# Patient Record
Sex: FEMALE | Race: WHITE | NOT HISPANIC OR LATINO | Employment: FULL TIME | URBAN - NONMETROPOLITAN AREA
[De-identification: names, ages, dates, MRNs, and addresses within clinical notes are randomized per-mention and may not be internally consistent; named-entity substitution may affect disease eponyms.]

---

## 2018-02-06 ENCOUNTER — HISTORICAL (OUTPATIENT)
Dept: ADMINISTRATIVE | Facility: HOSPITAL | Age: 43
End: 2018-02-06

## 2018-04-06 ENCOUNTER — HISTORICAL (OUTPATIENT)
Dept: ADMINISTRATIVE | Facility: HOSPITAL | Age: 43
End: 2018-04-06

## 2018-07-07 ENCOUNTER — HISTORICAL (OUTPATIENT)
Dept: ADMINISTRATIVE | Facility: HOSPITAL | Age: 43
End: 2018-07-07

## 2020-06-09 ENCOUNTER — HISTORICAL (OUTPATIENT)
Dept: ADMINISTRATIVE | Facility: HOSPITAL | Age: 45
End: 2020-06-09

## 2021-03-09 LAB
ALBUMIN SERPL-MCNC: 4.1 G/DL (ref 3.4–5)
ALBUMIN/GLOB SERPL: 1.6 {RATIO}
ALP SERPL-CCNC: 55 U/L (ref 50–144)
ALT SERPL-CCNC: 9 U/L (ref 1–45)
ANION GAP SERPL CALC-SCNC: 4 MMOL/L (ref 7–16)
AST SERPL-CCNC: 18 U/L (ref 14–36)
BASOPHILS # BLD AUTO: 0.03 X10(3)/MCL (ref 0.01–0.08)
BASOPHILS NFR BLD AUTO: 0.4 % (ref 0.1–1.2)
BILIRUB SERPL-MCNC: 0.59 MG/DL (ref 0.1–1)
BUN SERPL-MCNC: 20 MG/DL (ref 7–20)
CALCIUM SERPL-MCNC: 9.5 MG/DL (ref 8.4–10.2)
CHLORIDE SERPL-SCNC: 105 MMOL/L (ref 94–110)
CHOLEST SERPL-MCNC: 163 MG/DL (ref 0–200)
CO2 SERPL-SCNC: 28 MMOL/L (ref 21–32)
CREAT SERPL-MCNC: 0.7 MG/DL (ref 0.52–1.04)
CREAT/UREA NIT SERPL: 28.6 (ref 12–20)
EOSINOPHIL # BLD AUTO: 0.13 X10(3)/MCL (ref 0.04–0.36)
EOSINOPHIL NFR BLD AUTO: 1.7 % (ref 0.7–7)
ERYTHROCYTE [DISTWIDTH] IN BLOOD BY AUTOMATED COUNT: 12.4 % (ref 11–14.5)
GLOBULIN SER-MCNC: 2.5 G/DL (ref 2–3.9)
GLUCOSE SERPL-MCNC: 85 MGM./DL (ref 70–115)
HCT VFR BLD AUTO: 43 % (ref 36–48)
HDLC SERPL-MCNC: 60 MG/DL (ref 40–60)
HGB BLD-MCNC: 14.1 G/DL (ref 11.8–16)
IMM GRANULOCYTES # BLD AUTO: 0.01 X10E3/UL (ref 0–0.03)
IMM GRANULOCYTES NFR BLD AUTO: 0.1 % (ref 0–0.5)
LDLC SERPL CALC-MCNC: 92.6 MG/DL (ref 30–100)
LYMPHOCYTES # BLD AUTO: 2.32 X10(3)/MCL (ref 1.16–3.74)
LYMPHOCYTES NFR BLD AUTO: 31 % (ref 20–55)
MCH RBC QN AUTO: 31.3 PG (ref 27–34)
MCHC RBC AUTO-ENTMCNC: 32.8 G/DL (ref 31–37)
MCV RBC AUTO: 95.3 FL (ref 79–99)
MONOCYTES # BLD AUTO: 0.68 X10(3)/MCL (ref 0.24–0.36)
MONOCYTES NFR BLD AUTO: 9.1 % (ref 4.7–12.5)
NEUTROPHILS # BLD AUTO: 4.32 X10(3)/MCL (ref 1.56–6.13)
NEUTROPHILS NFR BLD AUTO: 57.7 % (ref 37–73)
PLATELET # BLD AUTO: 241 X10(3)/MCL (ref 140–371)
PMV BLD AUTO: 11.9 FL (ref 9.4–12.4)
POTASSIUM SERPL-SCNC: 4.7 MMOL/L (ref 3.5–5.1)
PROT SERPL-MCNC: 6.6 G/DL (ref 6.3–8.2)
RBC # BLD AUTO: 4.51 X10(6)/MCL (ref 4–5.1)
SODIUM SERPL-SCNC: 137 MMOL/L (ref 135–145)
TRIGL SERPL-MCNC: 56 MG/DL (ref 30–200)
TSH SERPL-ACNC: 1.1 UIU/ML (ref 0.36–3.74)
WBC # SPEC AUTO: 7.5 X10(3)/MCL (ref 4–11.5)

## 2021-10-13 LAB
HUMAN PAPILLOMAVIRUS (HPV): POSITIVE
PAP RECOMMENDATION EXT: ABNORMAL
PAP SMEAR: ABNORMAL

## 2022-03-21 ENCOUNTER — HISTORICAL (OUTPATIENT)
Dept: ADMINISTRATIVE | Facility: HOSPITAL | Age: 47
End: 2022-03-21

## 2022-04-11 ENCOUNTER — HISTORICAL (OUTPATIENT)
Dept: ADMINISTRATIVE | Facility: HOSPITAL | Age: 47
End: 2022-04-11

## 2022-04-29 VITALS
BODY MASS INDEX: 22.84 KG/M2 | HEIGHT: 62 IN | WEIGHT: 124.13 LBS | OXYGEN SATURATION: 98 % | SYSTOLIC BLOOD PRESSURE: 120 MMHG | DIASTOLIC BLOOD PRESSURE: 80 MMHG

## 2022-05-09 ENCOUNTER — HISTORICAL (OUTPATIENT)
Dept: ADMINISTRATIVE | Facility: HOSPITAL | Age: 47
End: 2022-05-09

## 2022-11-09 ENCOUNTER — HISTORICAL (OUTPATIENT)
Dept: ADMINISTRATIVE | Facility: HOSPITAL | Age: 47
End: 2022-11-09

## 2022-12-28 ENCOUNTER — HISTORICAL (OUTPATIENT)
Dept: ADMINISTRATIVE | Facility: HOSPITAL | Age: 47
End: 2022-12-28

## 2022-12-28 LAB — NONINV COLON CA DNA+OCC BLD SCRN STL QL: NEGATIVE

## 2023-01-19 ENCOUNTER — DOCUMENTATION ONLY (OUTPATIENT)
Dept: ADMINISTRATIVE | Facility: HOSPITAL | Age: 48
End: 2023-01-19
Payer: MEDICAID

## 2023-03-20 VITALS
SYSTOLIC BLOOD PRESSURE: 118 MMHG | HEART RATE: 73 BPM | HEIGHT: 62 IN | OXYGEN SATURATION: 97 % | DIASTOLIC BLOOD PRESSURE: 80 MMHG | WEIGHT: 128.31 LBS | TEMPERATURE: 99 F | BODY MASS INDEX: 23.61 KG/M2

## 2023-03-20 RX ORDER — FAMOTIDINE 20 MG/1
20 TABLET, FILM COATED ORAL 2 TIMES DAILY
COMMUNITY
Start: 2022-04-04 | End: 2023-04-28

## 2023-03-20 RX ORDER — NAPROXEN 500 MG/1
1 TABLET ORAL 2 TIMES DAILY
COMMUNITY
Start: 2022-10-21 | End: 2023-11-06

## 2023-03-20 RX ORDER — VILAZODONE HYDROCHLORIDE 40 MG/1
40 TABLET ORAL DAILY
COMMUNITY
Start: 2022-04-04 | End: 2023-04-28

## 2023-03-20 RX ORDER — ALPRAZOLAM 1 MG/1
1 TABLET ORAL DAILY PRN
COMMUNITY
Start: 2022-12-12 | End: 2023-06-13 | Stop reason: SDUPTHER

## 2023-04-19 VITALS
OXYGEN SATURATION: 97 % | BODY MASS INDEX: 23.61 KG/M2 | DIASTOLIC BLOOD PRESSURE: 80 MMHG | WEIGHT: 128.31 LBS | HEIGHT: 62 IN | TEMPERATURE: 99 F | SYSTOLIC BLOOD PRESSURE: 118 MMHG | HEART RATE: 73 BPM

## 2023-04-28 DIAGNOSIS — F33.9 MAJOR DEPRESSIVE DISORDER, RECURRENT, UNSPECIFIED: ICD-10-CM

## 2023-04-28 RX ORDER — VILAZODONE HYDROCHLORIDE 40 MG/1
TABLET ORAL
Qty: 30 TABLET | Refills: 0 | Status: SHIPPED | OUTPATIENT
Start: 2023-04-28 | End: 2023-06-06

## 2023-04-28 RX ORDER — FAMOTIDINE 20 MG/1
TABLET, FILM COATED ORAL
Qty: 60 TABLET | Refills: 0 | Status: SHIPPED | OUTPATIENT
Start: 2023-04-28 | End: 2023-06-13 | Stop reason: SDUPTHER

## 2023-05-24 VITALS
OXYGEN SATURATION: 97 % | HEIGHT: 62 IN | DIASTOLIC BLOOD PRESSURE: 80 MMHG | WEIGHT: 128.31 LBS | BODY MASS INDEX: 23.61 KG/M2 | TEMPERATURE: 99 F | SYSTOLIC BLOOD PRESSURE: 118 MMHG | HEART RATE: 73 BPM

## 2023-05-24 RX ORDER — VALACYCLOVIR HYDROCHLORIDE 500 MG/1
500 TABLET, FILM COATED ORAL NIGHTLY
COMMUNITY
Start: 2023-02-17 | End: 2023-09-19

## 2023-05-24 RX ORDER — MUPIROCIN 20 MG/G
1 OINTMENT TOPICAL 2 TIMES DAILY
COMMUNITY
Start: 2023-01-10 | End: 2023-11-06

## 2023-05-31 PROCEDURE — 80053 COMPREHEN METABOLIC PANEL: CPT | Performed by: NURSE PRACTITIONER

## 2023-05-31 PROCEDURE — 84443 ASSAY THYROID STIM HORMONE: CPT | Performed by: NURSE PRACTITIONER

## 2023-05-31 PROCEDURE — 83036 HEMOGLOBIN GLYCOSYLATED A1C: CPT | Performed by: NURSE PRACTITIONER

## 2023-05-31 PROCEDURE — 85025 COMPLETE CBC W/AUTO DIFF WBC: CPT | Performed by: NURSE PRACTITIONER

## 2023-05-31 PROCEDURE — 80061 LIPID PANEL: CPT | Performed by: NURSE PRACTITIONER

## 2023-06-06 DIAGNOSIS — F33.9 MAJOR DEPRESSIVE DISORDER, RECURRENT, UNSPECIFIED: ICD-10-CM

## 2023-06-06 RX ORDER — VILAZODONE HYDROCHLORIDE 40 MG/1
TABLET ORAL
Qty: 30 TABLET | Refills: 0 | Status: SHIPPED | OUTPATIENT
Start: 2023-06-06 | End: 2023-06-13 | Stop reason: SDUPTHER

## 2023-06-13 ENCOUNTER — OFFICE VISIT (OUTPATIENT)
Dept: FAMILY MEDICINE | Facility: CLINIC | Age: 48
End: 2023-06-13
Payer: MEDICAID

## 2023-06-13 ENCOUNTER — DOCUMENTATION ONLY (OUTPATIENT)
Dept: FAMILY MEDICINE | Facility: CLINIC | Age: 48
End: 2023-06-13

## 2023-06-13 VITALS
TEMPERATURE: 98 F | WEIGHT: 129.44 LBS | HEIGHT: 62 IN | SYSTOLIC BLOOD PRESSURE: 110 MMHG | OXYGEN SATURATION: 96 % | BODY MASS INDEX: 23.82 KG/M2 | DIASTOLIC BLOOD PRESSURE: 64 MMHG | HEART RATE: 70 BPM

## 2023-06-13 DIAGNOSIS — F33.9 EPISODE OF RECURRENT MAJOR DEPRESSIVE DISORDER, UNSPECIFIED DEPRESSION EPISODE SEVERITY: ICD-10-CM

## 2023-06-13 DIAGNOSIS — Z72.0 TOBACCO USER: ICD-10-CM

## 2023-06-13 DIAGNOSIS — F33.9 RECURRENT MAJOR DEPRESSIVE DISORDER, REMISSION STATUS UNSPECIFIED: ICD-10-CM

## 2023-06-13 DIAGNOSIS — R12 HEARTBURN: ICD-10-CM

## 2023-06-13 DIAGNOSIS — R10.13 EPIGASTRIC PAIN: ICD-10-CM

## 2023-06-13 DIAGNOSIS — F41.1 GENERALIZED ANXIETY DISORDER: ICD-10-CM

## 2023-06-13 DIAGNOSIS — Z00.01 ABNORMAL PHYSICAL EVALUATION: Primary | ICD-10-CM

## 2023-06-13 PROBLEM — G47.00 INSOMNIA: Status: ACTIVE | Noted: 2023-06-13

## 2023-06-13 PROCEDURE — 1159F PR MEDICATION LIST DOCUMENTED IN MEDICAL RECORD: ICD-10-PCS | Mod: CPTII,,, | Performed by: NURSE PRACTITIONER

## 2023-06-13 PROCEDURE — 1159F MED LIST DOCD IN RCRD: CPT | Mod: CPTII,,, | Performed by: NURSE PRACTITIONER

## 2023-06-13 PROCEDURE — 3078F DIAST BP <80 MM HG: CPT | Mod: CPTII,,, | Performed by: NURSE PRACTITIONER

## 2023-06-13 PROCEDURE — 1160F RVW MEDS BY RX/DR IN RCRD: CPT | Mod: CPTII,,, | Performed by: NURSE PRACTITIONER

## 2023-06-13 PROCEDURE — 1160F PR REVIEW ALL MEDS BY PRESCRIBER/CLIN PHARMACIST DOCUMENTED: ICD-10-PCS | Mod: CPTII,,, | Performed by: NURSE PRACTITIONER

## 2023-06-13 PROCEDURE — 99396 PR PREVENTIVE VISIT,EST,40-64: ICD-10-PCS | Mod: ,,, | Performed by: NURSE PRACTITIONER

## 2023-06-13 PROCEDURE — 3078F PR MOST RECENT DIASTOLIC BLOOD PRESSURE < 80 MM HG: ICD-10-PCS | Mod: CPTII,,, | Performed by: NURSE PRACTITIONER

## 2023-06-13 PROCEDURE — 3008F BODY MASS INDEX DOCD: CPT | Mod: CPTII,,, | Performed by: NURSE PRACTITIONER

## 2023-06-13 PROCEDURE — 3008F PR BODY MASS INDEX (BMI) DOCUMENTED: ICD-10-PCS | Mod: CPTII,,, | Performed by: NURSE PRACTITIONER

## 2023-06-13 PROCEDURE — 99396 PREV VISIT EST AGE 40-64: CPT | Mod: ,,, | Performed by: NURSE PRACTITIONER

## 2023-06-13 PROCEDURE — 3074F SYST BP LT 130 MM HG: CPT | Mod: CPTII,,, | Performed by: NURSE PRACTITIONER

## 2023-06-13 PROCEDURE — 3074F PR MOST RECENT SYSTOLIC BLOOD PRESSURE < 130 MM HG: ICD-10-PCS | Mod: CPTII,,, | Performed by: NURSE PRACTITIONER

## 2023-06-13 RX ORDER — VILAZODONE HYDROCHLORIDE 40 MG/1
40 TABLET ORAL DAILY
Qty: 30 TABLET | Refills: 11 | Status: SHIPPED | OUTPATIENT
Start: 2023-06-13 | End: 2024-03-18 | Stop reason: SDUPTHER

## 2023-06-13 RX ORDER — PANTOPRAZOLE SODIUM 40 MG/1
40 TABLET, DELAYED RELEASE ORAL DAILY
Qty: 30 TABLET | Refills: 11 | OUTPATIENT
Start: 2023-06-13 | End: 2023-08-17

## 2023-06-13 RX ORDER — ALPRAZOLAM 1 MG/1
1 TABLET ORAL DAILY PRN
Qty: 20 TABLET | Refills: 2 | Status: SHIPPED | OUTPATIENT
Start: 2023-06-13 | End: 2023-09-19 | Stop reason: SDUPTHER

## 2023-06-13 RX ORDER — SUCRALFATE 1 G/1
1 TABLET ORAL
Qty: 120 TABLET | Refills: 1 | Status: SHIPPED | OUTPATIENT
Start: 2023-06-13 | End: 2023-08-17

## 2023-06-13 RX ORDER — FAMOTIDINE 20 MG/1
20 TABLET, FILM COATED ORAL 2 TIMES DAILY
Qty: 60 TABLET | Refills: 11 | Status: SHIPPED | OUTPATIENT
Start: 2023-06-13

## 2023-06-13 NOTE — PROGRESS NOTES
Patient ID: Dawna Romero  : 1975    Chief Complaint: Annual Exam    Allergies: Patient has No Known Allergies.     History of Present Illness:  The patient is a 47 y.o. White female who presents to clinic for annual wellness visit.    Diet and nutrition:  Diet is high in salt, high in fat, low in fiber, high caloric intake, high carbohydrate meals, high calcium intake.    Fracture wrist: No history of fracture, no recent unexplained fracture.    Physical activity:  Does not exercise on a regular basis, good physical condition.    Depression risks:  + history of depression, never feel sad, empty, or tearful, no sleep disturbances, no agitation, no loss of energy, no feelings of worthlessness or guilt, no thoughts of suicide.    Orientation:  No disorientation to time, no disorientation to place.    Concentration and memory:  No decreased concentration ability, no memory lapses or loss, does not forget words.    Speech forward/motor difficulties: No speech difficulties, no difficulty expressing formulated concepts, no difficulty with fine manipulative tasks, no difficulty writing forward/copying, no slowed reaction time, does not knock things over when trying to pick them up.    Fall risk assessment:  No frequent falls while walking, no fall in the past year, no dizziness forward/vertigo, no fear of falling.  Hearing:  No loss of hearing, does not wear hearing aids.    Vision:  No vision problems, does wear glasses.    Activity of daily living: Able to bathe with limited or no assistance, able to control urination and bowels, able to dress with limited or no assistance, able to feed self with limited or no assistance, able to get out of chair or bed with limited or no assistance, able to [place] with limited or no assistance, able to toilet with limited are no assistance.    Activities of daily living:  Able to do housework with limited or no assistance, able to grocery shop with limited or no assistance,  able to manage medications with limited or no assistance, able to manage money with limited or no assistance, able to prepare meals with limited or no assistance, able to use the phone with limited or no assistance.    Screenings: not due for vaccinations, not due for breast cancer screening 12/28/22, not due for cervical cancer screening (apt with Dr. Jones July 2023), due for colorectal cancer screening.      Past Medical History:  has a past medical history of Anxiety, Generalized anxiety disorder, Heartburn, Insomnia, Numbness and tingling in right hand, Recurrent major depressive disorder, and Tobacco user.    Surgical History:  has a past surgical history that includes Cholecystectomy and Tubal ligation.    Family History: family history includes Diabetes type II in her mother.    Social History:  reports that she has been smoking cigarettes. She has a 10.00 pack-year smoking history. She has been exposed to tobacco smoke. She has never used smokeless tobacco.    Care Team: Patient Care Team:  JOSE Natarajan as PCP - General (Family Medicine)  Peterson Jones Jr., MD as Consulting Physician (Obstetrics and Gynecology)     Current Medications:  Current Outpatient Medications   Medication Instructions    ALPRAZolam (XANAX) 1 mg, Oral, Daily PRN    famotidine (PEPCID) 20 MG tablet Take 1 Tablet BY MOUTH TWO TIMES A DAY    mupirocin (BACTROBAN) 1 g, Topical (Top), 2 times daily    naproxen (NAPROSYN) 500 MG tablet 1 tablet, Oral, 2 times daily    valACYclovir (VALTREX) 500 mg, Oral, Nightly    vilazodone (VIIBRYD) 40 mg Tab tablet TAKE 1 TABLET BY MOUTH DAILY       Review of Systems   Constitutional:  Negative for appetite change, fatigue, fever and unexpected weight change.   HENT:  Negative for nasal congestion, ear pain, facial swelling, hearing loss, mouth sores, nosebleeds, sore throat and trouble swallowing.    Eyes:  Negative for pain, discharge, redness and visual disturbance.   Respiratory:  Negative for  "cough, chest tightness and shortness of breath.    Cardiovascular:  Negative for chest pain, palpitations and leg swelling.   Gastrointestinal:  Negative for abdominal pain, blood in stool, constipation, diarrhea and nausea.   Endocrine: Negative for cold intolerance, heat intolerance, polydipsia, polyphagia and polyuria.   Genitourinary:  Negative for difficulty urinating, dysuria, frequency and hematuria.   Musculoskeletal:  Negative for arthralgias, joint swelling and joint deformity.   Integumentary:  Negative for color change, rash and mole/lesion.   Neurological:  Negative for dizziness, weakness, headaches and memory loss.   Hematological:  Negative for adenopathy. Does not bruise/bleed easily.   Psychiatric/Behavioral:  Negative for confusion, sleep disturbance and suicidal ideas. The patient is nervous/anxious.       Visit Vitals  /64 (BP Location: Right arm, Patient Position: Sitting)   Pulse 70   Temp 98.1 °F (36.7 °C) (Temporal)   Ht 5' 1.81" (1.57 m)   Wt 58.7 kg (129 lb 6.6 oz)   LMP 06/08/2023 (Approximate)   SpO2 96%   BMI 23.82 kg/m²       Physical Exam  Vitals reviewed.   Constitutional:       General: She is not in acute distress.     Appearance: Normal appearance.   HENT:      Head: Normocephalic and atraumatic.      Right Ear: Tympanic membrane, ear canal and external ear normal.      Left Ear: Tympanic membrane, ear canal and external ear normal.      Nose: Nose normal. No congestion.      Mouth/Throat:      Mouth: Mucous membranes are moist.      Pharynx: Oropharynx is clear. No oropharyngeal exudate or posterior oropharyngeal erythema.   Eyes:      Extraocular Movements: Extraocular movements intact.      Conjunctiva/sclera: Conjunctivae normal.      Pupils: Pupils are equal, round, and reactive to light.   Cardiovascular:      Rate and Rhythm: Normal rate and regular rhythm.      Pulses: Normal pulses.      Heart sounds: No murmur heard.  Pulmonary:      Effort: Pulmonary effort is " normal.      Breath sounds: Normal breath sounds.   Abdominal:      General: Bowel sounds are normal.      Palpations: Abdomen is soft.      Tenderness: There is no abdominal tenderness.   Musculoskeletal:         General: No swelling, tenderness or deformity. Normal range of motion.      Cervical back: Normal range of motion and neck supple.   Lymphadenopathy:      Cervical: No cervical adenopathy.   Skin:     General: Skin is warm and dry.      Capillary Refill: Capillary refill takes less than 2 seconds.      Coloration: Skin is not jaundiced.      Findings: No rash.   Neurological:      Mental Status: She is alert and oriented to person, place, and time.      Cranial Nerves: No cranial nerve deficit.   Psychiatric:         Mood and Affect: Mood normal.         Behavior: Behavior normal.        Labs Reviewed:  Chemistry:  Lab Results   Component Value Date     05/31/2023    K 4.6 05/31/2023    CHLORIDE 107 05/31/2023    BUN 14.0 05/31/2023    CREATININE 0.61 (L) 05/31/2023    EGFRNORACEVR >90 05/31/2023    GLUCOSE 91 05/31/2023    CALCIUM 9.5 05/31/2023    ALKPHOS 60 05/31/2023    LABPROT 7.0 05/31/2023    ALBUMIN 4.7 05/31/2023    AST 24 05/31/2023    ALT 17 05/31/2023    TSH 0.995 05/31/2023        Lab Results   Component Value Date    HGBA1C 4.9 05/31/2023        Hematology:  Lab Results   Component Value Date    WBC 7.98 05/31/2023    RBC 4.21 05/31/2023    HGB 13.4 05/31/2023    HCT 38.7 05/31/2023    MCV 91.9 05/31/2023    MCH 31.8 05/31/2023    MCHC 34.6 05/31/2023    RDW 11.8 05/31/2023     05/31/2023    MPV 11.0 05/31/2023       Lipid Panel:  Lab Results   Component Value Date    CHOL 181 05/31/2023    HDL 68 (H) 05/31/2023    DLDL 91.2 05/31/2023    TRIG 58 05/31/2023        Assessment & Plan:  1. Abnormal physical evaluation  Overview:  Cervical Cancer Screening- established with Dr. Jones  Breast Cancer Screening- 12/28/22 mammogram, continue annual  Osteoporosis Screening- not due  Colon  [facility]   [facility]- [facility] with [facility]  Dental Exam- Re commend biannually.          2. Generalized anxiety disorder  Overview:  patient saw counselor and psychiatrist in the past. Uses xanax prn.     Assessment & Plan:  Has weaned down to 20 tabs/month    Orders:  -     ALPRAZolam (XANAX) 1 MG tablet; Take 1 tablet (1 mg total) by mouth daily as needed for Anxiety.  Dispense: 20 tablet; Refill: 2    3. Episode of recurrent major depressive disorder, unspecified depression episode severity  Overview:  Prozac and Effexor Prozac, Effexor, Vistaril ineffective.  Mood improved with Viibryd and p.r.n. Xanax      4. Tobacco user  Assessment & Plan:  Educated patient on need to identify triggers for cigarette smoking and to find an alternative to alleviate these triggers such as walking, eating unsalted sunflower seeds, eating carrots. Advised patient to develop a plan to quit smoking whether it is to decrease by a few cigarettes every 3-5 days or quit cold turkey. Advised patient to schedule a quit date. Spent 3 minutes discussing smoking cessation with patient. Patient states understanding. Patient declines medication.       5. Major depressive disorder, recurrent, unspecified  -     vilazodone (VIIBRYD) 40 mg Tab tablet; Take 1 tablet (40 mg total) by mouth once daily.  Dispense: 30 tablet; Refill: 11    6. Heartburn  -     famotidine (PEPCID) 20 MG tablet; Take 1 tablet (20 mg total) by mouth 2 (two) times daily.  Dispense: 60 tablet; Refill: 11  -     sucralfate (CARAFATE) 1 gram tablet; Take 1 tablet (1 g total) by mouth 4 (four) times daily before meals and nightly.  Dispense: 120 tablet; Refill: 1  -     pantoprazole (PROTONIX) 40 MG tablet; Take 1 tablet (40 mg total) by mouth once daily.  Dispense: 30 tablet; Refill: 11    7. Epigastric pain  -     sucralfate (CARAFATE) 1 gram tablet; Take 1 tablet (1 g total) by mouth 4 (four) times daily before meals  and nightly.  Dispense: 120 tablet; Refill: 1           Vaccinations:  Immunization History   Administered Date(s) Administered    DTaP 1975, 03/09/1978, 05/04/1978, 01/25/1979, 11/05/1981    IPV 1975, 03/09/1978, 05/04/1978, 01/25/1979, 11/05/1981    Influenza 02/10/2010    Influenza - Quadrivalent 10/30/2018, 10/07/2019, 03/16/2021    Influenza - Trivalent - PF (ADULT) 02/10/2010, 10/30/2018, 10/07/2019    MMR 01/25/1979    Td (ADULT) 06/15/1992, 06/05/2006    Tdap 11/08/2013, 05/12/2021       No future appointments.    Follow up for 1), 3 month f/u anxiety   2)1 year, Wellness, Fasting labs prior. Call sooner if needed.    JOSE BOLDEN

## 2023-06-13 NOTE — ASSESSMENT & PLAN NOTE
Educated patient on need to identify triggers for cigarette smoking and to find an alternative to alleviate these triggers such as walking, eating unsalted sunflower seeds, eating carrots. Advised patient to develop a plan to quit smoking whether it is to decrease by a few cigarettes every 3-5 days or quit cold turkey. Advised patient to schedule a quit date. Spent 3 minutes discussing smoking cessation with patient. Patient states understanding. Patient declines medication.

## 2023-08-17 ENCOUNTER — OFFICE VISIT (OUTPATIENT)
Dept: FAMILY MEDICINE | Facility: CLINIC | Age: 48
End: 2023-08-17
Payer: MEDICAID

## 2023-08-17 ENCOUNTER — HOSPITAL ENCOUNTER (EMERGENCY)
Facility: HOSPITAL | Age: 48
Discharge: HOME | End: 2023-08-17
Payer: MEDICAID

## 2023-08-17 VITALS
SYSTOLIC BLOOD PRESSURE: 122 MMHG | OXYGEN SATURATION: 98 % | WEIGHT: 136.13 LBS | HEART RATE: 71 BPM | DIASTOLIC BLOOD PRESSURE: 72 MMHG | WEIGHT: 133.81 LBS | BODY MASS INDEX: 25.05 KG/M2 | TEMPERATURE: 98 F | HEIGHT: 62 IN | SYSTOLIC BLOOD PRESSURE: 141 MMHG | DIASTOLIC BLOOD PRESSURE: 75 MMHG | HEIGHT: 62 IN | RESPIRATION RATE: 16 BRPM | HEART RATE: 80 BPM | TEMPERATURE: 98 F | BODY MASS INDEX: 24.63 KG/M2 | OXYGEN SATURATION: 98 %

## 2023-08-17 DIAGNOSIS — L02.224 BOIL, GROIN: ICD-10-CM

## 2023-08-17 DIAGNOSIS — R10.13 EPIGASTRIC PAIN: Primary | ICD-10-CM

## 2023-08-17 DIAGNOSIS — K29.00 ACUTE SUPERFICIAL GASTRITIS WITHOUT HEMORRHAGE: Primary | ICD-10-CM

## 2023-08-17 DIAGNOSIS — R10.13 EPIGASTRIC ABDOMINAL PAIN: ICD-10-CM

## 2023-08-17 LAB
ALBUMIN SERPL-MCNC: 4.1 G/DL (ref 3.4–5)
ALBUMIN/GLOB SERPL: 1.6 RATIO
ALP SERPL-CCNC: 54 UNIT/L (ref 50–144)
ALT SERPL-CCNC: 17 UNIT/L (ref 1–45)
AMPHET UR QL SCN: NEGATIVE
ANION GAP SERPL CALC-SCNC: 3 MEQ/L (ref 2–13)
APPEARANCE UR: CLEAR
AST SERPL-CCNC: 25 UNIT/L (ref 14–36)
B-HCG SERPL QL: NEGATIVE
BACTERIA #/AREA URNS AUTO: NORMAL /HPF
BARBITURATE SCN PRESENT UR: NEGATIVE
BASOPHILS # BLD AUTO: 0.04 X10(3)/MCL (ref 0.01–0.08)
BASOPHILS NFR BLD AUTO: 0.4 % (ref 0.1–1.2)
BENZODIAZ UR QL SCN: POSITIVE
BILIRUB SERPL-MCNC: 0.4 MG/DL (ref 0–1)
BILIRUB UR QL STRIP.AUTO: NEGATIVE
BUN SERPL-MCNC: 9 MG/DL (ref 7–20)
CALCIUM SERPL-MCNC: 9 MG/DL (ref 8.4–10.2)
CANNABINOIDS UR QL SCN: POSITIVE
CHLORIDE SERPL-SCNC: 107 MMOL/L (ref 98–110)
CO2 SERPL-SCNC: 29 MMOL/L (ref 21–32)
COCAINE UR QL SCN: NEGATIVE
COLOR UR: YELLOW
CREAT SERPL-MCNC: 0.64 MG/DL (ref 0.66–1.25)
CREAT/UREA NIT SERPL: 14 (ref 12–20)
EOSINOPHIL # BLD AUTO: 0.1 X10(3)/MCL (ref 0.04–0.36)
EOSINOPHIL NFR BLD AUTO: 0.9 % (ref 0.7–7)
ERYTHROCYTE [DISTWIDTH] IN BLOOD BY AUTOMATED COUNT: 11.7 % (ref 11–14.5)
GFR SERPLBLD CREATININE-BSD FMLA CKD-EPI: >90 MLS/MIN/1.73/M2
GLOBULIN SER-MCNC: 2.6 GM/DL (ref 2–3.9)
GLUCOSE SERPL-MCNC: 100 MG/DL (ref 70–115)
GLUCOSE UR QL STRIP.AUTO: NEGATIVE
HCT VFR BLD AUTO: 36.3 % (ref 36–48)
HGB BLD-MCNC: 12.6 G/DL (ref 11.8–16)
IMM GRANULOCYTES # BLD AUTO: 0.02 X10(3)/MCL (ref 0–0.03)
IMM GRANULOCYTES NFR BLD AUTO: 0.2 % (ref 0–0.5)
KETONES UR QL STRIP.AUTO: NEGATIVE
LEUKOCYTE ESTERASE UR QL STRIP.AUTO: NEGATIVE
LIPASE SERPL-CCNC: 38 U/L (ref 23–300)
LYMPHOCYTES # BLD AUTO: 3.4 X10(3)/MCL (ref 1.16–3.74)
LYMPHOCYTES NFR BLD AUTO: 32.2 % (ref 20–55)
MCH RBC QN AUTO: 31.9 PG (ref 27–34)
MCHC RBC AUTO-ENTMCNC: 34.7 G/DL (ref 31–37)
MCV RBC AUTO: 91.9 FL (ref 79–99)
METHADONE UR QL SCN: NEGATIVE
MONOCYTES # BLD AUTO: 0.89 X10(3)/MCL (ref 0.24–0.36)
MONOCYTES NFR BLD AUTO: 8.4 % (ref 4.7–12.5)
NEUTROPHILS # BLD AUTO: 6.1 X10(3)/MCL (ref 1.56–6.13)
NEUTROPHILS NFR BLD AUTO: 57.9 % (ref 37–73)
NITRITE UR QL STRIP.AUTO: NEGATIVE
NRBC BLD AUTO-RTO: 0 %
OPIATES UR QL SCN: NEGATIVE
PCP UR QL: NEGATIVE
PH UR STRIP.AUTO: 7.5 [PH]
PH UR: 7.5 [PH] (ref 3–11)
PLATELET # BLD AUTO: 247 X10(3)/MCL (ref 140–371)
PMV BLD AUTO: 10.4 FL (ref 9.4–12.4)
POTASSIUM SERPL-SCNC: 4 MMOL/L (ref 3.5–5.1)
PROT SERPL-MCNC: 6.7 GM/DL (ref 6.3–8.2)
PROT UR QL STRIP.AUTO: ABNORMAL
RBC # BLD AUTO: 3.95 X10(6)/MCL (ref 4–5.1)
RBC #/AREA URNS AUTO: NORMAL /HPF
RBC UR QL AUTO: ABNORMAL
SODIUM SERPL-SCNC: 139 MMOL/L (ref 135–145)
SP GR UR STRIP.AUTO: 1.01
SQUAMOUS #/AREA URNS AUTO: NORMAL /HPF
UROBILINOGEN UR STRIP-ACNC: 0.2
WBC # SPEC AUTO: 10.55 X10(3)/MCL (ref 4–11.5)
WBC #/AREA URNS AUTO: NORMAL /HPF

## 2023-08-17 PROCEDURE — 99214 OFFICE O/P EST MOD 30 MIN: CPT | Mod: ,,, | Performed by: NURSE PRACTITIONER

## 2023-08-17 PROCEDURE — 1159F MED LIST DOCD IN RCRD: CPT | Mod: CPTII,,, | Performed by: NURSE PRACTITIONER

## 2023-08-17 PROCEDURE — 3008F PR BODY MASS INDEX (BMI) DOCUMENTED: ICD-10-PCS | Mod: CPTII,,, | Performed by: NURSE PRACTITIONER

## 2023-08-17 PROCEDURE — 3008F BODY MASS INDEX DOCD: CPT | Mod: CPTII,,, | Performed by: NURSE PRACTITIONER

## 2023-08-17 PROCEDURE — 3044F HG A1C LEVEL LT 7.0%: CPT | Mod: CPTII,,, | Performed by: NURSE PRACTITIONER

## 2023-08-17 PROCEDURE — 3078F PR MOST RECENT DIASTOLIC BLOOD PRESSURE < 80 MM HG: ICD-10-PCS | Mod: CPTII,,, | Performed by: NURSE PRACTITIONER

## 2023-08-17 PROCEDURE — 81001 URINALYSIS AUTO W/SCOPE: CPT

## 2023-08-17 PROCEDURE — 85025 COMPLETE CBC W/AUTO DIFF WBC: CPT

## 2023-08-17 PROCEDURE — 96375 TX/PRO/DX INJ NEW DRUG ADDON: CPT

## 2023-08-17 PROCEDURE — 3078F DIAST BP <80 MM HG: CPT | Mod: CPTII,,, | Performed by: NURSE PRACTITIONER

## 2023-08-17 PROCEDURE — 1160F PR REVIEW ALL MEDS BY PRESCRIBER/CLIN PHARMACIST DOCUMENTED: ICD-10-PCS | Mod: CPTII,,, | Performed by: NURSE PRACTITIONER

## 2023-08-17 PROCEDURE — 36415 COLL VENOUS BLD VENIPUNCTURE: CPT

## 2023-08-17 PROCEDURE — 80307 DRUG TEST PRSMV CHEM ANLYZR: CPT

## 2023-08-17 PROCEDURE — 63600175 PHARM REV CODE 636 W HCPCS

## 2023-08-17 PROCEDURE — 1160F RVW MEDS BY RX/DR IN RCRD: CPT | Mod: CPTII,,, | Performed by: NURSE PRACTITIONER

## 2023-08-17 PROCEDURE — 81025 URINE PREGNANCY TEST: CPT

## 2023-08-17 PROCEDURE — 99214 PR OFFICE/OUTPT VISIT, EST, LEVL IV, 30-39 MIN: ICD-10-PCS | Mod: ,,, | Performed by: NURSE PRACTITIONER

## 2023-08-17 PROCEDURE — 83690 ASSAY OF LIPASE: CPT

## 2023-08-17 PROCEDURE — 96374 THER/PROPH/DIAG INJ IV PUSH: CPT

## 2023-08-17 PROCEDURE — 3074F PR MOST RECENT SYSTOLIC BLOOD PRESSURE < 130 MM HG: ICD-10-PCS | Mod: CPTII,,, | Performed by: NURSE PRACTITIONER

## 2023-08-17 PROCEDURE — 80053 COMPREHEN METABOLIC PANEL: CPT

## 2023-08-17 PROCEDURE — 1159F PR MEDICATION LIST DOCUMENTED IN MEDICAL RECORD: ICD-10-PCS | Mod: CPTII,,, | Performed by: NURSE PRACTITIONER

## 2023-08-17 PROCEDURE — 3074F SYST BP LT 130 MM HG: CPT | Mod: CPTII,,, | Performed by: NURSE PRACTITIONER

## 2023-08-17 PROCEDURE — 3044F PR MOST RECENT HEMOGLOBIN A1C LEVEL <7.0%: ICD-10-PCS | Mod: CPTII,,, | Performed by: NURSE PRACTITIONER

## 2023-08-17 PROCEDURE — 99284 EMERGENCY DEPT VISIT MOD MDM: CPT | Mod: 25

## 2023-08-17 PROCEDURE — 25000003 PHARM REV CODE 250

## 2023-08-17 RX ORDER — PANTOPRAZOLE SODIUM 40 MG/1
40 TABLET, DELAYED RELEASE ORAL DAILY
Qty: 30 TABLET | Refills: 0 | Status: SHIPPED | OUTPATIENT
Start: 2023-08-17 | End: 2023-09-19 | Stop reason: SDUPTHER

## 2023-08-17 RX ORDER — LIDOCAINE HYDROCHLORIDE 20 MG/ML
15 SOLUTION OROPHARYNGEAL ONCE
Status: DISCONTINUED | OUTPATIENT
Start: 2023-08-17 | End: 2023-08-18 | Stop reason: HOSPADM

## 2023-08-17 RX ORDER — ONDANSETRON 2 MG/ML
8 INJECTION INTRAMUSCULAR; INTRAVENOUS
Status: COMPLETED | OUTPATIENT
Start: 2023-08-17 | End: 2023-08-17

## 2023-08-17 RX ORDER — MAG HYDROX/ALUMINUM HYD/SIMETH 200-200-20
30 SUSPENSION, ORAL (FINAL DOSE FORM) ORAL ONCE
Status: COMPLETED | OUTPATIENT
Start: 2023-08-17 | End: 2023-08-17

## 2023-08-17 RX ORDER — HYDROMORPHONE HYDROCHLORIDE 1 MG/ML
0.5 INJECTION, SOLUTION INTRAMUSCULAR; INTRAVENOUS; SUBCUTANEOUS
Status: COMPLETED | OUTPATIENT
Start: 2023-08-17 | End: 2023-08-17

## 2023-08-17 RX ORDER — FAMOTIDINE 10 MG/ML
20 INJECTION INTRAVENOUS
Status: COMPLETED | OUTPATIENT
Start: 2023-08-17 | End: 2023-08-17

## 2023-08-17 RX ORDER — CLINDAMYCIN HYDROCHLORIDE 300 MG/1
300 CAPSULE ORAL EVERY 6 HOURS
Qty: 40 CAPSULE | Refills: 0 | Status: SHIPPED | OUTPATIENT
Start: 2023-08-17 | End: 2023-08-27

## 2023-08-17 RX ORDER — SUCRALFATE 1 G/1
1 TABLET ORAL 4 TIMES DAILY
Qty: 40 TABLET | Refills: 0 | Status: SHIPPED | OUTPATIENT
Start: 2023-08-17 | End: 2023-08-27

## 2023-08-17 RX ORDER — SUCRALFATE 1 G/10ML
1 SUSPENSION ORAL
Qty: 414 ML | Refills: 0 | OUTPATIENT
Start: 2023-08-17 | End: 2023-08-17

## 2023-08-17 RX ORDER — LORAZEPAM 2 MG/ML
1 INJECTION INTRAMUSCULAR
Status: COMPLETED | OUTPATIENT
Start: 2023-08-17 | End: 2023-08-17

## 2023-08-17 RX ORDER — ONDANSETRON 2 MG/ML
INJECTION INTRAMUSCULAR; INTRAVENOUS
Status: COMPLETED
Start: 2023-08-17 | End: 2023-08-17

## 2023-08-17 RX ADMIN — ONDANSETRON 8 MG: 2 INJECTION INTRAMUSCULAR; INTRAVENOUS at 10:08

## 2023-08-17 RX ADMIN — HYDROMORPHONE HYDROCHLORIDE 0.5 MG: 1 INJECTION, SOLUTION INTRAMUSCULAR; INTRAVENOUS; SUBCUTANEOUS at 10:08

## 2023-08-17 RX ADMIN — FAMOTIDINE 20 MG: 10 INJECTION, SOLUTION INTRAVENOUS at 10:08

## 2023-08-17 RX ADMIN — ALUMINUM HYDROXIDE, MAGNESIUM HYDROXIDE, AND SIMETHICONE 30 ML: 200; 200; 20 SUSPENSION ORAL at 10:08

## 2023-08-17 RX ADMIN — LORAZEPAM 1 MG: 2 INJECTION INTRAMUSCULAR; INTRAVENOUS at 10:08

## 2023-08-17 NOTE — PROGRESS NOTES
Patient ID: Dawna Romero  : 1975     Chief Complaint: Abdominal Pain    Allergies: Patient has No Known Allergies.     History of Present Illness:  The patient is a 47 y.o. White female who presents to clinic for evaluation and management with a chief complaint of Abdominal Pain   Patient requesting refill of carafate as this helped in the past when had similar abdominal pain.     Patient also reports a boil to right groin area. Denies any fever, drainage, n/v. Does admit to pain. Tried taking some left over keflex had at home but upset her stomach.     Abdominal Pain  This is a new problem. The current episode started in the past 7 days. The onset quality is sudden. The problem occurs constantly. The problem has been gradually improving. The pain is located in the epigastric region. The pain is moderate. The quality of the pain is burning and sharp. The abdominal pain does not radiate. Associated symptoms include anorexia. Pertinent negatives include no arthralgias, belching, constipation, diarrhea, dysuria, fever, flatus, frequency, headaches, hematochezia, hematuria, melena, myalgias, nausea, vomiting or weight loss. She has tried proton pump inhibitors and H2 blockers for the symptoms. The treatment provided mild relief.        Past Medical History:  has a past medical history of Anxiety, Generalized anxiety disorder, Heartburn, Insomnia, Numbness and tingling in right hand, Recurrent major depressive disorder, and Tobacco user.    Social History:  reports that she has been smoking cigarettes. She has a 10.0 pack-year smoking history. She has been exposed to tobacco smoke. She has never used smokeless tobacco.    Care Team: Patient Care Team:  Shaheen Wong APRN as PCP - General (Family Medicine)  Peterson Jones Jr., MD as Consulting Physician (Obstetrics and Gynecology)  Akira Chamberlain OD as Consulting Physician (Optometry)     Current Medications:  Current Outpatient Medications  "  Medication Instructions    ALPRAZolam (XANAX) 1 mg, Oral, Daily PRN    clindamycin (CLEOCIN) 300 mg, Oral, Every 6 hours    famotidine (PEPCID) 20 mg, Oral, 2 times daily    mupirocin (BACTROBAN) 1 g, Topical (Top), 2 times daily    naproxen (NAPROSYN) 500 MG tablet 1 tablet, Oral, 2 times daily    pantoprazole (PROTONIX) 40 mg, Oral, Daily    sucralfate (CARAFATE) 1 g, Oral, Before meals & nightly    valACYclovir (VALTREX) 500 mg, Oral, Nightly    vilazodone (VIIBRYD) 40 mg, Oral, Daily       Review of Systems   Constitutional:  Negative for fever and weight loss.   Gastrointestinal:  Positive for abdominal pain and anorexia. Negative for constipation, diarrhea, flatus, hematochezia, melena, nausea and vomiting.   Genitourinary:  Negative for dysuria, frequency and hematuria.   Musculoskeletal:  Negative for arthralgias and myalgias.   Neurological:  Negative for headaches.        Visit Vitals  /72 (BP Location: Right arm, Patient Position: Sitting, BP Method: Medium (Manual))   Pulse 80   Temp 98.2 °F (36.8 °C) (Temporal)   Ht 5' 1.81" (1.57 m)   Wt 60.7 kg (133 lb 13.1 oz)   LMP 08/06/2023 (Approximate)   SpO2 98%   BMI 24.63 kg/m²       Physical Exam  Vitals reviewed. Exam conducted with a chaperone present (YURY Hare).   Constitutional:       General: She is not in acute distress.     Appearance: Normal appearance.   HENT:      Head: Normocephalic and atraumatic.      Nose: Nose normal.      Mouth/Throat:      Mouth: Mucous membranes are moist.      Pharynx: Oropharynx is clear.   Eyes:      Conjunctiva/sclera: Conjunctivae normal.   Cardiovascular:      Rate and Rhythm: Normal rate and regular rhythm.      Pulses: Normal pulses.      Heart sounds: No murmur heard.  Pulmonary:      Effort: Pulmonary effort is normal.      Breath sounds: Normal breath sounds.   Abdominal:      General: Bowel sounds are normal.      Palpations: Abdomen is soft.      Tenderness: There is abdominal tenderness in the " epigastric area.   Musculoskeletal:         General: No swelling, tenderness or deformity. Normal range of motion.   Skin:     General: Skin is warm and dry.      Capillary Refill: Capillary refill takes less than 2 seconds.      Coloration: Skin is not jaundiced.      Findings: Lesion (boil to right labia majora, firm, tender, no drainage. approximately 1cm) present. No rash.   Neurological:      Mental Status: She is alert and oriented to person, place, and time.      Cranial Nerves: No cranial nerve deficit.   Psychiatric:         Mood and Affect: Mood normal.         Behavior: Behavior normal.            Labs Reviewed:  Chemistry:  Lab Results   Component Value Date     05/31/2023    K 4.6 05/31/2023    CHLORIDE 107 05/31/2023    BUN 14.0 05/31/2023    CREATININE 0.61 (L) 05/31/2023    EGFRNORACEVR >90 05/31/2023    GLUCOSE 91 05/31/2023    CALCIUM 9.5 05/31/2023    ALKPHOS 60 05/31/2023    LABPROT 7.0 05/31/2023    ALBUMIN 4.7 05/31/2023    AST 24 05/31/2023    ALT 17 05/31/2023    TSH 0.995 05/31/2023        Lab Results   Component Value Date    HGBA1C 4.9 05/31/2023        Hematology:  Lab Results   Component Value Date    WBC 7.98 05/31/2023    RBC 4.21 05/31/2023    HGB 13.4 05/31/2023    HCT 38.7 05/31/2023    MCV 91.9 05/31/2023    MCH 31.8 05/31/2023    MCHC 34.6 05/31/2023    RDW 11.8 05/31/2023     05/31/2023    MPV 11.0 05/31/2023       Lipid Panel:  Lab Results   Component Value Date    CHOL 181 05/31/2023    HDL 68 (H) 05/31/2023    DLDL 91.2 05/31/2023    TRIG 58 05/31/2023        Assessment & Plan:  1. Epigastric pain  -     sucralfate (CARAFATE) 100 mg/mL suspension; Take 10 mLs (1 g total) by mouth 4 (four) times daily before meals and nightly.  Dispense: 414 mL; Refill: 0    2. Boil, groin  Comments:  Warm moist compresses/warm soaks and tub.  Notify of any worsening.  Complete antibiotics as prescribed  Orders:  -     clindamycin (CLEOCIN) 300 MG capsule; Take 1 capsule (300 mg  total) by mouth every 6 (six) hours. for 10 days  Dispense: 40 capsule; Refill: 0         Future Appointments   Date Time Provider Department Center   9/14/2023 10:30 AM Shaheen Wong APRN [facility]   6/11/2024  8:00 AM LAB, [facility] LABORATORY DRAW [facility]   6/18/2024 10:00 [facility]       No follow-ups on file. Call sooner if needed.    JOSE BOLDEN

## 2023-08-18 ENCOUNTER — TELEPHONE (OUTPATIENT)
Dept: FAMILY MEDICINE | Facility: CLINIC | Age: 48
End: 2023-08-18
Payer: MEDICAID

## 2023-08-18 NOTE — ED PROVIDER NOTES
Encounter Date: 8/17/2023       History     Chief Complaint   Patient presents with    Abdominal Pain     Pt. Reports epigastric abd pain with diarrhea. Onset pass 2 day. Reports Hx of ulcers.reports not to be compliant with home meds. Denies vomiting, dysuria and fever.       47-year-old female presents complaining of epigastric pain for 2 days.  She has been nauseated, but has not been vomiting.  She has also experienced some diarrhea.  She has a history of cholecystectomy in the past.  Additionally, she has a history of peptic ulcer disease and gastritis.  There has been no fever or chills.    The history is provided by the patient.     Review of patient's allergies indicates:  No Known Allergies  Past Medical History:   Diagnosis Date    Anxiety     Generalized anxiety disorder     Heartburn     Insomnia     Numbness and tingling in right hand     Recurrent major depressive disorder     Tobacco user      Past Surgical History:   Procedure Laterality Date    CHOLECYSTECTOMY      TUBAL LIGATION       Family History   Problem Relation Age of Onset    Diabetes type II Mother      Social History     Tobacco Use    Smoking status: Every Day     Current packs/day: 1.00     Average packs/day: 1 pack/day for 10.0 years (10.0 ttl pk-yrs)     Types: Cigarettes     Passive exposure: Current    Smokeless tobacco: Never     Review of Systems   Constitutional:  Negative for fever.   HENT:  Negative for sore throat.    Respiratory:  Negative for shortness of breath.    Cardiovascular:  Negative for chest pain.   Gastrointestinal:  Positive for abdominal pain, diarrhea and nausea. Negative for vomiting.   Genitourinary:  Negative for dysuria.   Musculoskeletal:  Negative for back pain.   Skin:  Negative for rash.   Neurological:  Negative for weakness.   Hematological:  Does not bruise/bleed easily.   All other systems reviewed and are negative.      Physical Exam     Initial Vitals [08/17/23 2148]   BP Pulse Resp Temp SpO2    (!) 151/88 77 19 98.2 °F (36.8 °C) 99 %      MAP       --         Physical Exam    Nursing note reviewed.  Constitutional: Vital signs are normal. She appears well-developed and well-nourished. She is cooperative.   Patient appears rather uncomfortable   HENT:   Head: Normocephalic and atraumatic.   Mouth/Throat: Oropharynx is clear and moist.   Eyes: Conjunctivae, EOM and lids are normal. Pupils are equal, round, and reactive to light.   Neck: Trachea normal. Neck supple.   Normal range of motion.  Cardiovascular:  Normal rate, regular rhythm, normal heart sounds and intact distal pulses.           Pulmonary/Chest: Breath sounds normal.   Abdominal: Abdomen is soft. Bowel sounds are normal. She exhibits no distension. There is no abdominal tenderness. There is no rebound and no guarding.   Musculoskeletal:         General: Normal range of motion.      Cervical back: Normal, normal range of motion and neck supple.      Lumbar back: Normal.     Neurological: She is alert and oriented to person, place, and time. She has normal strength. Coordination normal. GCS score is 15. GCS eye subscore is 4. GCS verbal subscore is 5. GCS motor subscore is 6.   Skin: Skin is warm, dry and intact. Capillary refill takes less than 2 seconds.   Psychiatric: Her speech is normal and behavior is normal. Judgment and thought content normal. Cognition and memory are normal.   Patient appears a bit anxious         ED Course   Procedures  Labs Reviewed   COMPREHENSIVE METABOLIC PANEL - Abnormal; Notable for the following components:       Result Value    Creatinine 0.64 (*)     All other components within normal limits   CBC WITH DIFFERENTIAL - Abnormal; Notable for the following components:    RBC 3.95 (*)     Mono # 0.89 (*)     All other components within normal limits   URINALYSIS, REFLEX TO URINE CULTURE - Abnormal; Notable for the following components:    Protein, UA Trace (*)     Blood, UA Trace-Intact (*)     All other components  within normal limits    Narrative:      URINE STABILITY IS 2 HOURS AT ROOM TEMP OR    SIX HOURS REFRIGERATED. PERFORMING TESTING ON    SPECIMENS GREATER THAN THIS AGE MAY AFFECT THE    FOLLOWING TESTS:    PH          SPECIFIC GRAVITY           BLOOD    CLARITY     BILIRUBIN               UROBILINOGEN   DRUG SCREEN, URINE (BEAKER) - Abnormal; Notable for the following components:    Benzodiazepine, Urine Positive (*)     Cannabinoids, Urine Positive (*)     All other components within normal limits    Narrative:     Cut off concentrations:    Amphetamines - 1000 ng/ml  Barbiturates - 200 ng/ml  Benzodiazepine - 200 ng/ml  Cannabinoids (THC) - 50 ng/ml  Cocaine - 300 ng/ml  Fentanyl - 1.0 ng/ml  MDMA - 500 ng/ml  Opiates - 300 ng/ml   Phencyclidine (PCP) - 25 ng/ml  Methadone - 300 ng/ml      False negatives may result form substances such as bleach added to urine.  False positives may result for the presence of a substance with similar chemical structure to the drug or its metabolite.    This test provides only a PRELIMINARY analytical test result. A more specific alternate chemical method must be used in order to obtain a confirmed analytical result. Gas chromatography/mass spectrometry (GC/MS) is the preferred confirmatory method. Other chemical confirmation methods are available. Clinical consideration and professional judgement should be applied to any drug of abuse test result, particularly when preliminary positive results are used.    Positive results will be confirmed only at the physicians request. Unconfirmed screening results are to be used only for medical purposes (treatment).          LIPASE - Normal   HCG QUALITATIVE URINE - Normal   URINALYSIS, MICROSCOPIC - Normal   CBC W/ AUTO DIFFERENTIAL    Narrative:     The following orders were created for panel order CBC auto differential.  Procedure                               Abnormality         Status                     ---------                                -----------         ------                     CBC with Differential[[id number]]        Abnormal            Final result                 Please view results for these tests on the individual orders.          Imaging Results    None          Medications   aluminum-magnesium hydroxide-simethicone 200-200-20 mg/5 mL suspension 30 mL (30 mLs Oral Given 8/17/23 2249)     And   LIDOcaine HCl 2% oral solution 15 mL (15 mLs Oral Not Given 8/17/23 2300)   famotidine (PF) injection 20 mg (20 mg Intravenous Given 8/17/23 2248)   HYDROmorphone injection 0.5 mg (0.5 mg Intravenous Given 8/17/23 2249)   LORazepam injection 1 mg (1 mg Intravenous Given 8/17/23 2248)     Medical Decision Making  Epigastric abdominal pain, nausea and diarrhea x2 days.  History of cholecystectomy.  Nontender on exam.  Anxious.  GI cocktail, Pepcid, Dilaudid/Ativan  Abdominal labs.  No imaging is indicated at this time.    Amount and/or Complexity of Data Reviewed  Labs: ordered. Decision-making details documented in ED Course.    Risk  OTC drugs.  Prescription drug management.               ED Course as of 08/17/23 2254   Thu Aug 17, 2023   2205 CBC auto differential(!)  No anemia, no elevated white blood cell count [TM]   2220 Lipase  No pancreatitis here [TM]   2221 Comprehensive metabolic panel(!)  Normal CMP [TM]      ED Course User Index  [TM] Pratik Islas MD                    Clinical Impression:   Final diagnoses:  [R10.13] Epigastric abdominal pain  [K29.00] Acute superficial gastritis without hemorrhage (Primary)        ED Disposition Condition    Discharge Good          ED Prescriptions       Medication Sig Dispense Start Date End Date Auth. Provider    pantoprazole (PROTONIX) 40 MG tablet Take 1 tablet (40 mg total) by mouth once daily. 30 tablet 8/17/2023 9/16/2023 Pratik Islas MD    sucralfate (CARAFATE) 1 gram tablet Take 1 tablet (1 g total) by mouth 4 (four) times daily. for 10 days 40 tablet 8/17/2023 8/27/2023  Pratik Islas MD          Follow-up Information       Follow up With Specialties Details Why Contact Info    Shaheen Wong APRN Family Medicine Call in 1 day  [address] [facility]  [address]  [facility] [place]  [place] [address]  [phone]               Pratik Islas MD  08/17/23 5173

## 2023-09-08 ENCOUNTER — HOSPITAL ENCOUNTER (EMERGENCY)
Facility: HOSPITAL | Age: 48
Discharge: HOME | End: 2023-09-08
Payer: MEDICAID

## 2023-09-08 VITALS
SYSTOLIC BLOOD PRESSURE: 143 MMHG | HEART RATE: 87 BPM | OXYGEN SATURATION: 98 % | DIASTOLIC BLOOD PRESSURE: 76 MMHG | WEIGHT: 130 LBS | BODY MASS INDEX: 23.92 KG/M2 | HEIGHT: 62 IN | RESPIRATION RATE: 18 BRPM | TEMPERATURE: 98 F

## 2023-09-08 DIAGNOSIS — R10.13 EPIGASTRIC ABDOMINAL PAIN: Primary | ICD-10-CM

## 2023-09-08 LAB
ALBUMIN SERPL-MCNC: 4.3 G/DL (ref 3.4–5)
ALBUMIN/GLOB SERPL: 1.6 RATIO
ALP SERPL-CCNC: 65 UNIT/L (ref 50–144)
ALT SERPL-CCNC: 27 UNIT/L (ref 1–45)
ANION GAP SERPL CALC-SCNC: 6 MEQ/L (ref 2–13)
AST SERPL-CCNC: 30 UNIT/L (ref 14–36)
BASOPHILS # BLD AUTO: 0.02 X10(3)/MCL (ref 0.01–0.08)
BASOPHILS NFR BLD AUTO: 0.3 % (ref 0.1–1.2)
BILIRUB SERPL-MCNC: 0.5 MG/DL (ref 0–1)
BUN SERPL-MCNC: 11 MG/DL (ref 7–20)
CALCIUM SERPL-MCNC: 9.4 MG/DL (ref 8.4–10.2)
CHLORIDE SERPL-SCNC: 103 MMOL/L (ref 98–110)
CO2 SERPL-SCNC: 28 MMOL/L (ref 21–32)
CREAT SERPL-MCNC: 0.55 MG/DL (ref 0.66–1.25)
CREAT/UREA NIT SERPL: 20 (ref 12–20)
EOSINOPHIL # BLD AUTO: 0.05 X10(3)/MCL (ref 0.04–0.36)
EOSINOPHIL NFR BLD AUTO: 0.7 % (ref 0.7–7)
ERYTHROCYTE [DISTWIDTH] IN BLOOD BY AUTOMATED COUNT: 11.6 % (ref 11–14.5)
GFR SERPLBLD CREATININE-BSD FMLA CKD-EPI: >90 MLS/MIN/1.73/M2
GLOBULIN SER-MCNC: 2.7 GM/DL (ref 2–3.9)
GLUCOSE SERPL-MCNC: 113 MG/DL (ref 70–115)
HCT VFR BLD AUTO: 41.5 % (ref 36–48)
HGB BLD-MCNC: 14.2 G/DL (ref 11.8–16)
IMM GRANULOCYTES # BLD AUTO: 0.03 X10(3)/MCL (ref 0–0.03)
IMM GRANULOCYTES NFR BLD AUTO: 0.4 % (ref 0–0.5)
LIPASE SERPL-CCNC: 28 U/L (ref 23–300)
LYMPHOCYTES # BLD AUTO: 2.46 X10(3)/MCL (ref 1.16–3.74)
LYMPHOCYTES NFR BLD AUTO: 34.6 % (ref 20–55)
MCH RBC QN AUTO: 31.1 PG (ref 27–34)
MCHC RBC AUTO-ENTMCNC: 34.2 G/DL (ref 31–37)
MCV RBC AUTO: 90.8 FL (ref 79–99)
MONOCYTES # BLD AUTO: 0.4 X10(3)/MCL (ref 0.24–0.36)
MONOCYTES NFR BLD AUTO: 5.6 % (ref 4.7–12.5)
NEUTROPHILS # BLD AUTO: 4.16 X10(3)/MCL (ref 1.56–6.13)
NEUTROPHILS NFR BLD AUTO: 58.4 % (ref 37–73)
PLATELET # BLD AUTO: 197 X10(3)/MCL (ref 140–371)
PLATELET # BLD EST: NORMAL 10*3/UL
PMV BLD AUTO: 10.4 FL (ref 9.4–12.4)
POTASSIUM SERPL-SCNC: 4.1 MMOL/L (ref 3.5–5.1)
PROT SERPL-MCNC: 7 GM/DL (ref 6.3–8.2)
RBC # BLD AUTO: 4.57 X10(6)/MCL (ref 4–5.1)
RBC MORPH BLD: NORMAL
SODIUM SERPL-SCNC: 137 MMOL/L (ref 135–145)
WBC # SPEC AUTO: 7.12 X10(3)/MCL (ref 4–11.5)

## 2023-09-08 PROCEDURE — 80053 COMPREHEN METABOLIC PANEL: CPT | Performed by: EMERGENCY MEDICINE

## 2023-09-08 PROCEDURE — 83690 ASSAY OF LIPASE: CPT | Performed by: EMERGENCY MEDICINE

## 2023-09-08 PROCEDURE — 36415 COLL VENOUS BLD VENIPUNCTURE: CPT | Performed by: EMERGENCY MEDICINE

## 2023-09-08 PROCEDURE — 85025 COMPLETE CBC W/AUTO DIFF WBC: CPT | Performed by: EMERGENCY MEDICINE

## 2023-09-08 PROCEDURE — 99284 EMERGENCY DEPT VISIT MOD MDM: CPT | Mod: 25

## 2023-09-08 PROCEDURE — 63600175 PHARM REV CODE 636 W HCPCS: Performed by: EMERGENCY MEDICINE

## 2023-09-08 PROCEDURE — 25000003 PHARM REV CODE 250: Performed by: EMERGENCY MEDICINE

## 2023-09-08 PROCEDURE — 96375 TX/PRO/DX INJ NEW DRUG ADDON: CPT

## 2023-09-08 PROCEDURE — 96374 THER/PROPH/DIAG INJ IV PUSH: CPT

## 2023-09-08 RX ORDER — LIDOCAINE HYDROCHLORIDE 20 MG/ML
15 SOLUTION OROPHARYNGEAL ONCE
Status: DISCONTINUED | OUTPATIENT
Start: 2023-09-08 | End: 2023-09-08 | Stop reason: HOSPADM

## 2023-09-08 RX ORDER — ONDANSETRON 2 MG/ML
4 INJECTION INTRAMUSCULAR; INTRAVENOUS
Status: COMPLETED | OUTPATIENT
Start: 2023-09-08 | End: 2023-09-08

## 2023-09-08 RX ORDER — MAG HYDROX/ALUMINUM HYD/SIMETH 200-200-20
30 SUSPENSION, ORAL (FINAL DOSE FORM) ORAL ONCE
Status: COMPLETED | OUTPATIENT
Start: 2023-09-08 | End: 2023-09-08

## 2023-09-08 RX ORDER — FAMOTIDINE 10 MG/ML
20 INJECTION INTRAVENOUS
Status: COMPLETED | OUTPATIENT
Start: 2023-09-08 | End: 2023-09-08

## 2023-09-08 RX ADMIN — ONDANSETRON 4 MG: 2 INJECTION INTRAMUSCULAR; INTRAVENOUS at 04:09

## 2023-09-08 RX ADMIN — ALUMINUM HYDROXIDE, MAGNESIUM HYDROXIDE, AND SIMETHICONE 30 ML: 200; 200; 20 SUSPENSION ORAL at 03:09

## 2023-09-08 RX ADMIN — FAMOTIDINE 20 MG: 10 INJECTION, SOLUTION INTRAVENOUS at 04:09

## 2023-09-08 NOTE — ED PROVIDER NOTES
"Encounter Date: 9/8/2023       History     Chief Complaint   Patient presents with    Abdominal Pain     Patient complaining of epigastric that started 9/7/2023 at 19:00 after eating fried shrimp.      Patient is a 47-year-old female who presents today with complaints of epigastric abdominal pain.  She states "I have these episodes kind of often." She says she takes multiple stomach medications.  She is unsure of the name of them, but states she is prescribed one med 4 times a day " to coat my stomach." She says that she was seen here just a couple weeks ago.  She says she was given a medication at 1st that helped, but then she was given IV pain medication, and she did not like the way the pain medication made her feel.  She states that she got dizzy after the pain medication.  She denies any blood in her stool.  She reports 1 episode vomiting upon arrival to the emergency department.  She says that she already feels a little better after vomiting.  Denies fever, chest pain, dyspnea, flank pain, dysuria, hematuria, and all other symptoms.      Review of patient's allergies indicates:  No Known Allergies  Past Medical History:   Diagnosis Date    Anxiety     Generalized anxiety disorder     Heartburn     Insomnia     Numbness and tingling in right hand     Recurrent major depressive disorder     Tobacco user      Past Surgical History:   Procedure Laterality Date    CHOLECYSTECTOMY      TUBAL LIGATION       Family History   Problem Relation Age of Onset    Diabetes type II Mother      Social History     Tobacco Use    Smoking status: Every Day     Current packs/day: 1.00     Average packs/day: 1 pack/day for 10.0 years (10.0 ttl pk-yrs)     Types: Cigarettes     Passive exposure: Current    Smokeless tobacco: Never     Review of Systems   Gastrointestinal:  Positive for abdominal pain, nausea and vomiting.       Physical Exam     Initial Vitals [09/08/23 0305]   BP Pulse Resp Temp SpO2   (!) 169/98 60 18 97.7 °F " (36.5 °C) 98 %      MAP       --         Physical Exam    ED Course   Procedures  Labs Reviewed   COMPREHENSIVE METABOLIC PANEL - Abnormal; Notable for the following components:       Result Value    Creatinine 0.55 (*)     All other components within normal limits   CBC WITH DIFFERENTIAL - Abnormal; Notable for the following components:    Mono # 0.40 (*)     All other components within normal limits   LIPASE - Normal   BLOOD SMEAR MICROSCOPIC EXAM ([facility]) - Normal   CBC W/ AUTO DIFFERENTIAL    Narrative:     The following orders were created for panel order CBC auto differential.  Procedure                               Abnormality         Status                     ---------                               -----------         ------                     CBC with Differential[[id number]]        Abnormal            Final result                 Please view results for these tests on the individual orders.          Imaging Results    None          Medications   aluminum-magnesium hydroxide-simethicone 200-200-20 mg/5 mL suspension 30 mL (30 mLs Oral Given 9/8/23 0350)     And   LIDOcaine HCl 2% oral solution 15 mL (15 mLs Oral Not Given 9/8/23 0445)   ondansetron injection 4 mg (4 mg Intravenous Given 9/8/23 0401)   famotidine (PF) injection 20 mg (20 mg Intravenous Given 9/8/23 0403)     Medical Decision Making  47-year-old female with epigastric pain and known gastritis GERD.  History of cholecystectomy.  She was treated with Zofran, to acid, and famotidine with complete resolution of her symptoms.  Patient states that she is supposed to be getting an EGD soon.  Advised outpatient follow-up.  ER return precautions provided.  Patient to continue her home regimen of multiple medications for gastritis    Amount and/or Complexity of Data Reviewed  External Data Reviewed: notes.     Details: Patient has a history of cholecystectomy.  Last ED visit, patient was given a GI cocktail and said  Labs: ordered. Decision-making  details documented in ED Course.    Risk  OTC drugs.  Prescription drug management.                               Clinical Impression:   Final diagnoses:  [R10.13] Epigastric abdominal pain (Primary)        ED Disposition Condition    Discharge Stable          ED Prescriptions    None       Follow-up Information       Follow up With Specialties Details Why Contact Info    Shaheen Wong APRN Family Medicine Call   [address]  [address]  [facility] [id number]  [phone]      [facility] Emergency Medicine  As needed, If symptoms worsen [address]  [facility] [id number] [phone]             Bhavesh Sharp MD  09/08/23 9975

## 2023-09-18 PROBLEM — Z00.01 ABNORMAL PHYSICAL EVALUATION: Status: RESOLVED | Noted: 2023-06-13 | Resolved: 2023-09-18

## 2023-09-19 ENCOUNTER — OFFICE VISIT (OUTPATIENT)
Dept: FAMILY MEDICINE | Facility: CLINIC | Age: 48
End: 2023-09-19
Payer: MEDICAID

## 2023-09-19 VITALS
HEART RATE: 94 BPM | WEIGHT: 131.19 LBS | DIASTOLIC BLOOD PRESSURE: 60 MMHG | OXYGEN SATURATION: 97 % | HEIGHT: 62 IN | SYSTOLIC BLOOD PRESSURE: 118 MMHG | BODY MASS INDEX: 24.14 KG/M2 | TEMPERATURE: 98 F

## 2023-09-19 DIAGNOSIS — R10.13 EPIGASTRIC PAIN: ICD-10-CM

## 2023-09-19 DIAGNOSIS — F41.1 GENERALIZED ANXIETY DISORDER: Primary | ICD-10-CM

## 2023-09-19 DIAGNOSIS — R12 HEARTBURN: ICD-10-CM

## 2023-09-19 PROCEDURE — 99214 PR OFFICE/OUTPT VISIT, EST, LEVL IV, 30-39 MIN: ICD-10-PCS | Mod: ,,, | Performed by: NURSE PRACTITIONER

## 2023-09-19 PROCEDURE — 3078F DIAST BP <80 MM HG: CPT | Mod: CPTII,,, | Performed by: NURSE PRACTITIONER

## 2023-09-19 PROCEDURE — 1159F PR MEDICATION LIST DOCUMENTED IN MEDICAL RECORD: ICD-10-PCS | Mod: CPTII,,, | Performed by: NURSE PRACTITIONER

## 2023-09-19 PROCEDURE — 3008F PR BODY MASS INDEX (BMI) DOCUMENTED: ICD-10-PCS | Mod: CPTII,,, | Performed by: NURSE PRACTITIONER

## 2023-09-19 PROCEDURE — 3074F PR MOST RECENT SYSTOLIC BLOOD PRESSURE < 130 MM HG: ICD-10-PCS | Mod: CPTII,,, | Performed by: NURSE PRACTITIONER

## 2023-09-19 PROCEDURE — 3044F HG A1C LEVEL LT 7.0%: CPT | Mod: CPTII,,, | Performed by: NURSE PRACTITIONER

## 2023-09-19 PROCEDURE — 99214 OFFICE O/P EST MOD 30 MIN: CPT | Mod: ,,, | Performed by: NURSE PRACTITIONER

## 2023-09-19 PROCEDURE — 1159F MED LIST DOCD IN RCRD: CPT | Mod: CPTII,,, | Performed by: NURSE PRACTITIONER

## 2023-09-19 PROCEDURE — 3008F BODY MASS INDEX DOCD: CPT | Mod: CPTII,,, | Performed by: NURSE PRACTITIONER

## 2023-09-19 PROCEDURE — 3078F PR MOST RECENT DIASTOLIC BLOOD PRESSURE < 80 MM HG: ICD-10-PCS | Mod: CPTII,,, | Performed by: NURSE PRACTITIONER

## 2023-09-19 PROCEDURE — 3074F SYST BP LT 130 MM HG: CPT | Mod: CPTII,,, | Performed by: NURSE PRACTITIONER

## 2023-09-19 PROCEDURE — 3044F PR MOST RECENT HEMOGLOBIN A1C LEVEL <7.0%: ICD-10-PCS | Mod: CPTII,,, | Performed by: NURSE PRACTITIONER

## 2023-09-19 RX ORDER — ALPRAZOLAM 1 MG/1
1 TABLET ORAL DAILY PRN
Qty: 20 TABLET | Refills: 2 | Status: SHIPPED | OUTPATIENT
Start: 2023-09-19 | End: 2023-12-18 | Stop reason: SDUPTHER

## 2023-09-19 RX ORDER — PANTOPRAZOLE SODIUM 40 MG/1
40 TABLET, DELAYED RELEASE ORAL DAILY
Qty: 30 TABLET | Refills: 0 | Status: SHIPPED | OUTPATIENT
Start: 2023-09-19 | End: 2023-11-06

## 2023-09-19 RX ORDER — SUCRALFATE 1 G/1
1 TABLET ORAL 4 TIMES DAILY
COMMUNITY

## 2023-09-19 NOTE — PROGRESS NOTES
Patient ID: Dawna Romero  : 1975     Chief Complaint: Anxiety (3mth f/u anxiety)    Allergies: Patient has No Known Allergies.     History of Present Illness:  The patient is a 47 y.o. White female who presents to clinic for evaluation and management with a chief complaint of Anxiety (3mth f/u anxiety)   Anxiety  Presents for follow-up visit. Symptoms include excessive worry, muscle tension, nausea, nervous/anxious behavior, panic and restlessness. Patient reports no chest pain, compulsions, confusion, decreased concentration, depressed mood, dizziness, dry mouth, feeling of choking, hyperventilation, impotence, insomnia, irritability, malaise, obsessions, shortness of breath or suicidal ideas. The severity of symptoms is causing significant distress and interfering with daily activities. The quality of sleep is fair.       Abdominal Pain  This is a recurrent problem. The current episode started more than 1 month ago. The onset quality is gradual. The problem occurs intermittently. The problem has been waxing and waning. The pain is located in the epigastric region. The pain is at a severity of 10/10. The quality of the pain is aching and burning. The abdominal pain does not radiate. Associated symptoms include anorexia, belching, nausea and vomiting. Pertinent negatives include no arthralgias, dysuria, fever, flatus, frequency, hematochezia, hematuria, melena or weight loss. Nothing aggravates the pain. She has tried proton pump inhibitors for the symptoms. The treatment provided mild relief. Prior diagnostic workup includes CT scan. Her past medical history is significant for abdominal surgery.        Past Medical History:  has a past medical history of Anxiety, Generalized anxiety disorder, Heartburn, Insomnia, Numbness and tingling in right hand, Recurrent major depressive disorder, and Tobacco user.    Social History:  reports that she has been smoking cigarettes. She has a 10.0 pack-year smoking  "history. She has been exposed to tobacco smoke. She has never used smokeless tobacco. She reports that she does not drink alcohol and does not use drugs.    Care Team: Patient Care Team:  Shaheen Wong APRN as PCP - General (Family Medicine)  Peterson Jones Jr., MD as Consulting Physician (Obstetrics and Gynecology)  Akira Chamberlain OD as Consulting Physician (Optometry)     Current Medications:  Current Outpatient Medications   Medication Instructions    ALPRAZolam (XANAX) 1 mg, Oral, Daily PRN    famotidine (PEPCID) 20 mg, Oral, 2 times daily    mupirocin (BACTROBAN) 1 g, Topical (Top), 2 times daily    naproxen (NAPROSYN) 500 MG tablet 1 tablet, Oral, 2 times daily    pantoprazole (PROTONIX) 40 mg, Oral, Daily    sucralfate (CARAFATE) 1 g, Oral, 4 times daily    vilazodone (VIIBRYD) 40 mg, Oral, Daily       Review of Systems   Constitutional:  Negative for fever, irritability and weight loss.   Respiratory:  Negative for shortness of breath.    Cardiovascular:  Negative for chest pain.   Gastrointestinal:  Positive for abdominal pain, anorexia, nausea and vomiting. Negative for flatus, hematochezia and melena.   Genitourinary:  Negative for dysuria, frequency, hematuria and impotence.   Musculoskeletal:  Negative for arthralgias.   Neurological:  Negative for dizziness.   Psychiatric/Behavioral:  Negative for confusion, decreased concentration and suicidal ideas. The patient is nervous/anxious. The patient does not have insomnia.         Visit Vitals  /60 (BP Location: Right arm)   Pulse 94   Temp 97.9 °F (36.6 °C) (Temporal)   Ht 5' 2" (1.575 m)   Wt 59.5 kg (131 lb 3.2 oz)   LMP 08/28/2023 (Approximate)   SpO2 97%   BMI 24.00 kg/m²       Physical Exam  Vitals reviewed. Exam conducted with a chaperone present (YURY Hare).   Constitutional:       General: She is not in acute distress.     Appearance: Normal appearance.   HENT:      Head: Normocephalic and atraumatic.      Nose: Nose normal.      " Mouth/Throat:      Mouth: Mucous membranes are moist.      Pharynx: Oropharynx is clear.   Eyes:      Conjunctiva/sclera: Conjunctivae normal.   Cardiovascular:      Rate and Rhythm: Normal rate and regular rhythm.      Pulses: Normal pulses.      Heart sounds: No murmur heard.  Pulmonary:      Effort: Pulmonary effort is normal.      Breath sounds: Normal breath sounds.   Abdominal:      General: Bowel sounds are normal.      Palpations: Abdomen is soft.      Tenderness: There is abdominal tenderness in the epigastric area.   Musculoskeletal:         General: No swelling, tenderness or deformity. Normal range of motion.   Skin:     General: Skin is warm and dry.      Capillary Refill: Capillary refill takes less than 2 seconds.      Coloration: Skin is not jaundiced.      Findings: No rash.   Neurological:      Mental Status: She is alert and oriented to person, place, and time.      Cranial Nerves: No cranial nerve deficit.   Psychiatric:         Mood and Affect: Mood normal.         Behavior: Behavior normal.          Labs Reviewed:  Chemistry:  Lab Results   Component Value Date     09/08/2023    K 4.1 09/08/2023    CHLORIDE 103 09/08/2023    BUN 11.0 09/08/2023    CREATININE 0.55 (L) 09/08/2023    EGFRNORACEVR >90 09/08/2023    GLUCOSE 113 09/08/2023    CALCIUM 9.4 09/08/2023    ALKPHOS 65 09/08/2023    LABPROT 7.0 09/08/2023    ALBUMIN 4.3 09/08/2023    AST 30 09/08/2023    ALT 27 09/08/2023    TSH 0.995 05/31/2023        Lab Results   Component Value Date    HGBA1C 4.9 05/31/2023        Hematology:  Lab Results   Component Value Date    WBC 7.12 09/08/2023    RBC 4.57 09/08/2023    HGB 14.2 09/08/2023    HCT 41.5 09/08/2023    MCV 90.8 09/08/2023    MCH 31.1 09/08/2023    MCHC 34.2 09/08/2023    RDW 11.6 09/08/2023     09/08/2023    MPV 10.4 09/08/2023       Lipid Panel:  Lab Results   Component Value Date    CHOL 181 05/31/2023    HDL 68 (H) 05/31/2023    DLDL 91.2 05/31/2023    TRIG 58  05/31/2023        Assessment & Plan:  1. Generalized anxiety disorder  Overview:  patient saw counselor and psychiatrist in the past. Uses xanax prn (has weaned to no more than 20 tabs per month)    Orders:  -     ALPRAZolam (XANAX) 1 MG tablet; Take 1 tablet (1 mg total) by mouth daily as needed for Anxiety.  Dispense: 20 tablet; Refill: 2    2. Epigastric pain  Comments:  continue PPI and carafate, consult for EGD  Orders:  -     pantoprazole (PROTONIX) 40 MG tablet; Take 1 tablet (40 mg total) by mouth once daily.  Dispense: 30 tablet; Refill: 0  -     Ambulatory referral/consult to General Surgery; Future; Expected date: 09/26/2023    3. Heartburn  -     pantoprazole (PROTONIX) 40 MG tablet; Take 1 tablet (40 mg total) by mouth once daily.  Dispense: 30 tablet; Refill: 0  -     Ambulatory referral/consult to General Surgery; Future; Expected date: 09/26/2023         Future Appointments   Date Time Provider Department Center   12/19/2023  8:30 AM Shaheen Wong APRN [facility]   6/11/2024  8:00 AM LAB, [facility] LABORATORY DRAW ST[facility]   6/18/2024 10:00 AM Shaheen Wong APRN [facility]       Follow up for 3 month f/u anxiety. Call sooner if needed.    JOSE BOLDEN

## 2023-09-20 ENCOUNTER — TELEPHONE (OUTPATIENT)
Dept: FAMILY MEDICINE | Facility: CLINIC | Age: 48
End: 2023-09-20
Payer: MEDICAID

## 2023-09-20 NOTE — TELEPHONE ENCOUNTER
There are no other medications to add, as she should be taking pepcid, protonix and carafate. Avoid high fat meals at this time. Try to follow a bland diet. I placed new referral yesterday for patient to have scope done

## 2023-10-30 ENCOUNTER — TELEPHONE (OUTPATIENT)
Dept: FAMILY MEDICINE | Facility: CLINIC | Age: 48
End: 2023-10-30
Payer: MEDICAID

## 2023-11-02 ENCOUNTER — TELEPHONE (OUTPATIENT)
Dept: FAMILY MEDICINE | Facility: CLINIC | Age: 48
End: 2023-11-02
Payer: MEDICAID

## 2023-11-03 DIAGNOSIS — Z01.818 PREOP EXAMINATION: Primary | ICD-10-CM

## 2023-11-06 ENCOUNTER — CLINICAL SUPPORT (OUTPATIENT)
Dept: RESPIRATORY THERAPY | Facility: HOSPITAL | Age: 48
End: 2023-11-06
Attending: ANESTHESIOLOGY
Payer: MEDICAID

## 2023-11-06 DIAGNOSIS — Z01.818 PREOP EXAMINATION: ICD-10-CM

## 2023-11-06 PROCEDURE — 93005 ELECTROCARDIOGRAM TRACING: CPT

## 2023-11-06 PROCEDURE — 93010 EKG 12-LEAD: ICD-10-PCS | Mod: ,,, | Performed by: STUDENT IN AN ORGANIZED HEALTH CARE EDUCATION/TRAINING PROGRAM

## 2023-11-06 PROCEDURE — 93010 ELECTROCARDIOGRAM REPORT: CPT | Mod: ,,, | Performed by: STUDENT IN AN ORGANIZED HEALTH CARE EDUCATION/TRAINING PROGRAM

## 2023-11-07 ENCOUNTER — ANESTHESIA EVENT (OUTPATIENT)
Dept: SURGERY | Facility: HOSPITAL | Age: 48
End: 2023-11-07
Payer: MEDICAID

## 2023-11-07 NOTE — ANESTHESIA PREPROCEDURE EVALUATION
11/07/2023  Dawna Romero is a 48 y.o., female.      Pre-op Assessment    I have reviewed the Patient Summary Reports.     I have reviewed the Nursing Notes. I have reviewed the NPO Status.   I have reviewed the Medications.     Review of Systems  Anesthesia Hx:  Denies Family Hx of Anesthesia complications.   Denies Personal Hx of Anesthesia complications.   Social:  Smoker, No Alcohol Use    Hematology/Oncology:  Hematology Normal   Oncology Normal     EENT/Dental:EENT/Dental Normal   Cardiovascular:  Cardiovascular Normal  ECG has been reviewed.    Pulmonary:  Pulmonary Normal    Renal/:  Renal/ Normal     Hepatic/GI:  Hepatic/GI Normal    Musculoskeletal:  Musculoskeletal Normal    Neurological:  Neurology Normal    Endocrine:  Endocrine Normal    Psych:   Psychiatric History          Physical Exam  General: Cooperative, Alert and Oriented    Airway:  Mallampati: II   Mouth Opening: Normal  TM Distance: Normal  Tongue: Normal  Neck ROM: Normal ROM    Dental:  Intact        Anesthesia Plan  Type of Anesthesia, risks & benefits discussed:    Anesthesia Type: Gen Natural Airway  Intra-op Monitoring Plan: Standard ASA Monitors  Post Op Pain Control Plan:   (medical reason for not using multimodal pain management)  Induction:  IV  Informed Consent: Informed consent signed with the Patient and all parties understand the risks and agree with anesthesia plan.  All questions answered. Patient consented to blood products? Yes  ASA Score: 2    Ready For Surgery From Anesthesia Perspective.     .

## 2023-11-08 ENCOUNTER — HOSPITAL ENCOUNTER (OUTPATIENT)
Facility: HOSPITAL | Age: 48
Discharge: HOME | End: 2023-11-08
Attending: SURGERY | Admitting: SURGERY
Payer: MEDICAID

## 2023-11-08 ENCOUNTER — ANESTHESIA (OUTPATIENT)
Dept: SURGERY | Facility: HOSPITAL | Age: 48
End: 2023-11-08
Payer: MEDICAID

## 2023-11-08 VITALS
DIASTOLIC BLOOD PRESSURE: 54 MMHG | OXYGEN SATURATION: 99 % | TEMPERATURE: 98 F | BODY MASS INDEX: 24.69 KG/M2 | SYSTOLIC BLOOD PRESSURE: 112 MMHG | WEIGHT: 135 LBS | HEART RATE: 64 BPM | RESPIRATION RATE: 18 BRPM

## 2023-11-08 DIAGNOSIS — R10.13 EPIGASTRIC PAIN: ICD-10-CM

## 2023-11-08 LAB — B-HCG SERPL QL: NEGATIVE

## 2023-11-08 PROCEDURE — 63600175 PHARM REV CODE 636 W HCPCS: Performed by: ANESTHESIOLOGY

## 2023-11-08 PROCEDURE — 37000008 HC ANESTHESIA 1ST 15 MINUTES: Performed by: SURGERY

## 2023-11-08 PROCEDURE — 63600175 PHARM REV CODE 636 W HCPCS: Performed by: NURSE ANESTHETIST, CERTIFIED REGISTERED

## 2023-11-08 PROCEDURE — D9220A PRA ANESTHESIA: ICD-10-PCS | Mod: ,,, | Performed by: NURSE ANESTHETIST, CERTIFIED REGISTERED

## 2023-11-08 PROCEDURE — 88305 TISSUE EXAM BY PATHOLOGIST: CPT | Performed by: SURGERY

## 2023-11-08 PROCEDURE — D9220A PRA ANESTHESIA: Mod: ,,, | Performed by: NURSE ANESTHETIST, CERTIFIED REGISTERED

## 2023-11-08 PROCEDURE — 88313 SPECIAL STAINS GROUP 2: CPT

## 2023-11-08 PROCEDURE — 88312 SPECIAL STAINS GROUP 1: CPT

## 2023-11-08 PROCEDURE — 81025 URINE PREGNANCY TEST: CPT | Performed by: SURGERY

## 2023-11-08 PROCEDURE — 27201423 OPTIME MED/SURG SUP & DEVICES STERILE SUPPLY: Performed by: SURGERY

## 2023-11-08 PROCEDURE — 43239 EGD BIOPSY SINGLE/MULTIPLE: CPT | Performed by: SURGERY

## 2023-11-08 PROCEDURE — 37000009 HC ANESTHESIA EA ADD 15 MINS: Performed by: SURGERY

## 2023-11-08 PROCEDURE — 25000003 PHARM REV CODE 250: Performed by: NURSE ANESTHETIST, CERTIFIED REGISTERED

## 2023-11-08 RX ORDER — FENTANYL CITRATE 50 UG/ML
INJECTION, SOLUTION INTRAMUSCULAR; INTRAVENOUS
Status: DISCONTINUED | OUTPATIENT
Start: 2023-11-08 | End: 2023-11-08

## 2023-11-08 RX ORDER — PROPOFOL 10 MG/ML
VIAL (ML) INTRAVENOUS
Status: DISCONTINUED | OUTPATIENT
Start: 2023-11-08 | End: 2023-11-08

## 2023-11-08 RX ORDER — SODIUM CHLORIDE, SODIUM LACTATE, POTASSIUM CHLORIDE, CALCIUM CHLORIDE 600; 310; 30; 20 MG/100ML; MG/100ML; MG/100ML; MG/100ML
INJECTION, SOLUTION INTRAVENOUS CONTINUOUS
Status: DISCONTINUED | OUTPATIENT
Start: 2023-11-08 | End: 2023-11-08 | Stop reason: HOSPADM

## 2023-11-08 RX ORDER — LIDOCAINE HYDROCHLORIDE 20 MG/ML
INJECTION, SOLUTION EPIDURAL; INFILTRATION; INTRACAUDAL; PERINEURAL
Status: DISCONTINUED | OUTPATIENT
Start: 2023-11-08 | End: 2023-11-08

## 2023-11-08 RX ADMIN — FENTANYL CITRATE 100 MCG: 50 INJECTION, SOLUTION INTRAMUSCULAR; INTRAVENOUS at 10:11

## 2023-11-08 RX ADMIN — PROPOFOL 20 MG: 10 INJECTION, EMULSION INTRAVENOUS at 10:11

## 2023-11-08 RX ADMIN — SODIUM CHLORIDE, POTASSIUM CHLORIDE, SODIUM LACTATE AND CALCIUM CHLORIDE: 600; 310; 30; 20 INJECTION, SOLUTION INTRAVENOUS at 09:11

## 2023-11-08 RX ADMIN — LIDOCAINE HYDROCHLORIDE 5 ML: 20 INJECTION, SOLUTION EPIDURAL; INFILTRATION; INTRACAUDAL; PERINEURAL at 10:11

## 2023-11-08 RX ADMIN — PROPOFOL 80 MG: 10 INJECTION, EMULSION INTRAVENOUS at 10:11

## 2023-11-08 NOTE — ANESTHESIA POSTPROCEDURE EVALUATION
Anesthesia Post Evaluation    Patient: Dawna Romero    Procedure(s) Performed: Procedure(s) (LRB):  EGD (ESOPHAGOGASTRODUODENOSCOPY) (N/A)  EGD, WITH CLOSED BIOPSY (N/A)    Final Anesthesia Type: general      Patient location during evaluation: OPS  Patient participation: Yes- Able to Participate  Level of consciousness: awake and alert  Post-procedure vital signs: reviewed and stable  Pain management: adequate  Airway patency: patent  RIVAS mitigation strategies: Multimodal analgesia  PONV status at discharge: No PONV  Anesthetic complications: no      Cardiovascular status: blood pressure returned to baseline  Respiratory status: unassisted  Hydration status: euvolemic  Follow-up not needed.          Vitals Value Taken Time   /75 11/08/23 0940   Temp 36.9 °C (98.4 °F) 11/08/23 0940   Pulse 73 11/08/23 0940   Resp 16 11/08/23 1035   SpO2 98 % 11/08/23 0940         No case tracking events are documented in the log.      Pain/Ruth Score: No data recorded

## 2023-11-08 NOTE — BRIEF OP NOTE
[facility] - Periop Services  Brief Operative Note    Surgery Date: 11/8/2023     Surgeon(s) and Role:     * CASSIA Castro MD - Primary    Assisting Surgeon: None    Pre-op Diagnosis:  Epigastric pain [R10.13]    Post-op Diagnosis:  Post-Op Diagnosis Codes:     * Epigastric pain [R10.13]    Procedure(s) (LRB):  EGD (ESOPHAGOGASTRODUODENOSCOPY) (N/A)  EGD, WITH CLOSED BIOPSY (N/A)    Anesthesia: General    Operative Findings:   1. Duodenum normal up to the 3rd portion  2. Mild antritis without ulceration, biopsy taken with cold forceps    3. Mild diffuse gastritis without ulceration  4. Multiple subcentimeter white sessile lesions at the proximal stomach, to biopsied for evaluation  5. Z-line at 36 cm from the incisors, no evidence of hiatal hernia.  No mucosal change of the distal esophagus.  No suggestion of Tellez's mucosa.  There was some mild erythematous change of the distal esophagus which may suggest some mild reflux.    6.  At 14 cm there was a raised lesion that was removed with cold forceps.  This measured about 3 mm in size.    7.  Esophagus otherwise normal    Estimated Blood Loss: 2 mL         Specimens:   Specimen (24h ago, onward)       Start     Ordered    11/08/23 1034  Specimen to Pathology  RELEASE UPON ORDERING        References:    Click here for ordering Quick Tip   Question:  Release to patient  Answer:  Immediate    11/08/23 1034                      Discharge Note    OUTCOME: Patient tolerated treatment/procedure well without complication and is now ready for discharge.    DISPOSITION: Home or Self Care    FINAL DIAGNOSIS:  <principal problem not specified>    FOLLOWUP: In clinic    DISCHARGE INSTRUCTIONS:  No discharge procedures on file.

## 2023-11-08 NOTE — OP NOTE
11/08/2023    Preoperative Diagnosis:  Epigastric pain [R10.13]    Postoperative Diagnosis:  Epigastric pain [R10.13]    Procedure:  EGD with polypectomy and biopsy    Findings:  1. Duodenum normal up to the 3rd portion  2. Mild antritis without ulceration, biopsy taken with cold forceps    3. Mild diffuse gastritis without ulceration  4. Multiple subcentimeter white sessile lesions at the proximal stomach, to biopsied for evaluation  5. Z-line at 36 cm from the incisors, no evidence of hiatal hernia.  No mucosal change of the distal esophagus.  No suggestion of Tellez's mucosa.  There was some mild erythematous change of the distal esophagus which may suggest some mild reflux.    6.  At 14 cm there was a raised lesion that was removed with cold forceps.  This measured about 3 mm in size.    7.  Esophagus otherwise normal    Procedure:  Patient was brought to the endoscopy suite and placed in supine position.  Bite block was placed.  Sedation was provided via IV.  Time-out was carried out and agreed upon.  Scope was advanced through the oropharynx into the esophagus the stomach and then the duodenal.  The duodenal was intubated to the 3rd portion.  The walls were evaluated under distention.  No mucosal abnormalities were visualized for the duodenal mucosa seen.  The ampulla appeared normal.  Moving the scope back into the stomach, there appeared to be some mild erythematous change of the antrum.  Biopsy was taken with cold forceps to rule out H pylori.  There was some similar mild erythematous change of the body of the stomach.  Retroflexion was used to evaluate the proximal stomach.  No hiatal hernia was visualized.  There were several raised subcentimeter lesions that were white in color compared to the remainder of the mucosa.  These were clustered at the proximal stomach.  Two of these were biopsied.  Otherwise the Z-line rested at 36 cm without complication.  There were no distal esophageal mucosal ulcerations  or changes to suggest severe reflux.  There was some mild erythematous change of the distal esophagus which may suggest some mild reflux.  The remainder of the esophagus was normal except for a 3 mm raised lesion that was removed with cold forceps and submitted to pathology.  The scope was retrieved.    Patient tolerated the procedure well without complication.

## 2023-11-09 LAB — PSYCHE PATHOLOGY RESULT: NORMAL

## 2023-12-18 ENCOUNTER — OFFICE VISIT (OUTPATIENT)
Dept: FAMILY MEDICINE | Facility: CLINIC | Age: 48
End: 2023-12-18
Payer: MEDICAID

## 2023-12-18 VITALS
OXYGEN SATURATION: 99 % | TEMPERATURE: 97 F | BODY MASS INDEX: 25.21 KG/M2 | HEIGHT: 62 IN | DIASTOLIC BLOOD PRESSURE: 62 MMHG | WEIGHT: 137 LBS | SYSTOLIC BLOOD PRESSURE: 116 MMHG | HEART RATE: 80 BPM

## 2023-12-18 DIAGNOSIS — Z12.31 SCREENING MAMMOGRAM FOR BREAST CANCER: ICD-10-CM

## 2023-12-18 DIAGNOSIS — F41.1 GENERALIZED ANXIETY DISORDER: ICD-10-CM

## 2023-12-18 DIAGNOSIS — F33.9 RECURRENT MAJOR DEPRESSIVE DISORDER, REMISSION STATUS UNSPECIFIED: Primary | ICD-10-CM

## 2023-12-18 PROCEDURE — 3074F SYST BP LT 130 MM HG: CPT | Mod: CPTII,,, | Performed by: NURSE PRACTITIONER

## 2023-12-18 PROCEDURE — 3074F PR MOST RECENT SYSTOLIC BLOOD PRESSURE < 130 MM HG: ICD-10-PCS | Mod: CPTII,,, | Performed by: NURSE PRACTITIONER

## 2023-12-18 PROCEDURE — 3044F HG A1C LEVEL LT 7.0%: CPT | Mod: CPTII,,, | Performed by: NURSE PRACTITIONER

## 2023-12-18 PROCEDURE — 1160F RVW MEDS BY RX/DR IN RCRD: CPT | Mod: CPTII,,, | Performed by: NURSE PRACTITIONER

## 2023-12-18 PROCEDURE — 1159F MED LIST DOCD IN RCRD: CPT | Mod: CPTII,,, | Performed by: NURSE PRACTITIONER

## 2023-12-18 PROCEDURE — 99213 PR OFFICE/OUTPT VISIT, EST, LEVL III, 20-29 MIN: ICD-10-PCS | Mod: ,,, | Performed by: NURSE PRACTITIONER

## 2023-12-18 PROCEDURE — 1160F PR REVIEW ALL MEDS BY PRESCRIBER/CLIN PHARMACIST DOCUMENTED: ICD-10-PCS | Mod: CPTII,,, | Performed by: NURSE PRACTITIONER

## 2023-12-18 PROCEDURE — 3078F DIAST BP <80 MM HG: CPT | Mod: CPTII,,, | Performed by: NURSE PRACTITIONER

## 2023-12-18 PROCEDURE — 3078F PR MOST RECENT DIASTOLIC BLOOD PRESSURE < 80 MM HG: ICD-10-PCS | Mod: CPTII,,, | Performed by: NURSE PRACTITIONER

## 2023-12-18 PROCEDURE — 3044F PR MOST RECENT HEMOGLOBIN A1C LEVEL <7.0%: ICD-10-PCS | Mod: CPTII,,, | Performed by: NURSE PRACTITIONER

## 2023-12-18 PROCEDURE — 1159F PR MEDICATION LIST DOCUMENTED IN MEDICAL RECORD: ICD-10-PCS | Mod: CPTII,,, | Performed by: NURSE PRACTITIONER

## 2023-12-18 PROCEDURE — 3008F PR BODY MASS INDEX (BMI) DOCUMENTED: ICD-10-PCS | Mod: CPTII,,, | Performed by: NURSE PRACTITIONER

## 2023-12-18 PROCEDURE — 3008F BODY MASS INDEX DOCD: CPT | Mod: CPTII,,, | Performed by: NURSE PRACTITIONER

## 2023-12-18 PROCEDURE — 99213 OFFICE O/P EST LOW 20 MIN: CPT | Mod: ,,, | Performed by: NURSE PRACTITIONER

## 2023-12-18 RX ORDER — PANTOPRAZOLE SODIUM 40 MG/1
40 TABLET, DELAYED RELEASE ORAL DAILY
COMMUNITY
Start: 2023-11-20 | End: 2024-03-18 | Stop reason: SDUPTHER

## 2023-12-18 RX ORDER — ALPRAZOLAM 1 MG/1
1 TABLET ORAL DAILY PRN
Qty: 20 TABLET | Refills: 2 | Status: SHIPPED | OUTPATIENT
Start: 2023-12-18 | End: 2024-03-18 | Stop reason: SDUPTHER

## 2023-12-18 NOTE — PROGRESS NOTES
Patient ID: Dawna Romero  : 1975     Chief Complaint: Anxiety    Allergies: Patient has No Known Allergies.     History of Present Illness:  The patient is a 48 y.o. White female who presents to clinic for evaluation and management with a chief complaint of Anxiety   Reports mood stable on current medications.     Anxiety  Presents for follow-up visit. Symptoms include excessive worry, muscle tension, nervous/anxious behavior, panic and restlessness. Patient reports no chest pain, compulsions, confusion, decreased concentration, depressed mood, dizziness, dry mouth, feeling of choking, hyperventilation, impotence, insomnia, irritability, malaise, obsessions, shortness of breath or suicidal ideas. The severity of symptoms is causing significant distress and interfering with daily activities. The quality of sleep is fair.            Past Medical History:  has a past medical history of Anxiety, Generalized anxiety disorder, Heartburn, Insomnia, Numbness and tingling in right hand, Recurrent major depressive disorder, and Tobacco user.    Social History:  reports that she has been smoking cigarettes. She has a 10.0 pack-year smoking history. She has been exposed to tobacco smoke. She has never used smokeless tobacco. She reports that she does not drink alcohol and does not use drugs.    Care Team: Patient Care Team:  Shaheen Wong APRN as PCP - General (Family Medicine)  Peterson Jones Jr., MD as Consulting Physician (Obstetrics and Gynecology)  Akira Chamberlain OD as Consulting Physician (Optometry)  CASSIA Castro MD as Consulting Physician (General Surgery)     Current Medications:  Current Outpatient Medications   Medication Instructions    ALPRAZolam (XANAX) 1 mg, Oral, Daily PRN    famotidine (PEPCID) 20 mg, Oral, 2 times daily    pantoprazole (PROTONIX) 40 mg, Oral, Daily    sucralfate (CARAFATE) 1 g, Oral, 4 times daily    vilazodone (VIIBRYD) 40 mg, Oral, Daily       Review of  "Systems   Constitutional:  Negative for irritability.   Respiratory:  Negative for shortness of breath.    Cardiovascular:  Negative for chest pain.   Genitourinary:  Negative for impotence.   Neurological:  Negative for dizziness.   Psychiatric/Behavioral:  Negative for confusion, decreased concentration and suicidal ideas. The patient is nervous/anxious. The patient does not have insomnia.         Visit Vitals  /62 (BP Location: Right arm)   Pulse 80   Temp 97.2 °F (36.2 °C) (Temporal)   Ht 5' 2" (1.575 m)   Wt 62.1 kg (137 lb)   LMP 12/13/2023   SpO2 99%   BMI 25.06 kg/m²       Physical Exam  Vitals reviewed.   Constitutional:       General: She is not in acute distress.     Appearance: Normal appearance.   HENT:      Head: Normocephalic and atraumatic.      Nose: Nose normal. No congestion.      Mouth/Throat:      Mouth: Mucous membranes are moist.      Pharynx: Oropharynx is clear. No posterior oropharyngeal erythema.   Eyes:      Conjunctiva/sclera: Conjunctivae normal.   Cardiovascular:      Rate and Rhythm: Normal rate and regular rhythm.      Pulses: Normal pulses.      Heart sounds: No murmur heard.  Pulmonary:      Effort: Pulmonary effort is normal.      Breath sounds: Normal breath sounds.   Musculoskeletal:         General: No swelling. Normal range of motion.   Skin:     General: Skin is warm and dry.      Coloration: Skin is not jaundiced.      Findings: No rash.   Neurological:      Mental Status: She is alert and oriented to person, place, and time.      Cranial Nerves: No cranial nerve deficit.   Psychiatric:         Mood and Affect: Mood normal.         Behavior: Behavior normal.          Labs Reviewed:  Chemistry:  Lab Results   Component Value Date     09/08/2023    K 4.1 09/08/2023    CHLORIDE 103 09/08/2023    BUN 11.0 09/08/2023    CREATININE 0.55 (L) 09/08/2023    EGFRNORACEVR >90 09/08/2023    GLUCOSE 113 09/08/2023    CALCIUM 9.4 09/08/2023    ALKPHOS 65 09/08/2023    LABPROT " 7.0 09/08/2023    ALBUMIN 4.3 09/08/2023    AST 30 09/08/2023    ALT 27 09/08/2023    TSH 0.995 05/31/2023        Lab Results   Component Value Date    HGBA1C 4.9 05/31/2023        Hematology:  Lab Results   Component Value Date    WBC 7.12 09/08/2023    RBC 4.57 09/08/2023    HGB 14.2 09/08/2023    HCT 41.5 09/08/2023    MCV 90.8 09/08/2023    MCH 31.1 09/08/2023    MCHC 34.2 09/08/2023    RDW 11.6 09/08/2023     09/08/2023    MPV 10.4 09/08/2023       Lipid Panel:  Lab Results   Component Value Date    CHOL 181 05/31/2023    HDL 68 (H) 05/31/2023    DLDL 91.2 05/31/2023    TRIG 58 05/31/2023        Assessment & Plan:  1. Recurrent major depressive disorder, remission status unspecified  Overview:  Prozac and Effexor Prozac, Effexor, Vistaril ineffective.  Mood improved with Viibryd and p.r.n. Xanax      2. Generalized anxiety disorder  Overview:  patient saw counselor and psychiatrist in the past. Uses xanax prn (has weaned to no more than 20 tabs per month)    Orders:  -     ALPRAZolam (XANAX) 1 MG tablet; Take 1 tablet (1 mg total) by mouth daily as needed for Anxiety.  Dispense: 20 tablet; Refill: 2    3. Screening mammogram for breast cancer  -     Mammo Digital Screening Bilat w/ [facility]; Future; Expected date: 12/18/2023         Future Appointments   Date Time Provider Department Center   3/18/2024  1:00 PM Shaheen Wong APRN [facility]   6/11/2024  8:00 AM LAB, [facility]AT[facility]   6/18/2024 10:00 AM Shaheen Wong APRN [facility]       Follow up for 3 mo f/u, Depression/Anxiety. Call sooner if needed.    JOSE BOLDEN    Lab Frequency Next Occurrence   Ambulatory referral/consult to General Surgery Once 09/26/2023

## 2024-01-03 ENCOUNTER — HOSPITAL ENCOUNTER (OUTPATIENT)
Dept: RADIOLOGY | Facility: HOSPITAL | Age: 49
Discharge: HOME | End: 2024-01-03
Attending: NURSE PRACTITIONER
Payer: MEDICAID

## 2024-01-03 DIAGNOSIS — Z12.31 SCREENING MAMMOGRAM FOR BREAST CANCER: ICD-10-CM

## 2024-01-03 PROCEDURE — 77067 SCR MAMMO BI INCL CAD: CPT | Mod: TC

## 2024-03-18 ENCOUNTER — OFFICE VISIT (OUTPATIENT)
Dept: FAMILY MEDICINE | Facility: CLINIC | Age: 49
End: 2024-03-18
Payer: MEDICAID

## 2024-03-18 VITALS
HEART RATE: 98 BPM | SYSTOLIC BLOOD PRESSURE: 118 MMHG | OXYGEN SATURATION: 98 % | HEIGHT: 62 IN | BODY MASS INDEX: 24.99 KG/M2 | DIASTOLIC BLOOD PRESSURE: 78 MMHG | TEMPERATURE: 98 F | WEIGHT: 135.81 LBS

## 2024-03-18 DIAGNOSIS — F41.1 GENERALIZED ANXIETY DISORDER: ICD-10-CM

## 2024-03-18 DIAGNOSIS — F33.9 RECURRENT MAJOR DEPRESSIVE DISORDER, REMISSION STATUS UNSPECIFIED: Primary | ICD-10-CM

## 2024-03-18 DIAGNOSIS — Z00.01 ABNORMAL WELLNESS EXAM: ICD-10-CM

## 2024-03-18 DIAGNOSIS — R12 HEARTBURN: ICD-10-CM

## 2024-03-18 PROCEDURE — 3078F DIAST BP <80 MM HG: CPT | Mod: CPTII,,, | Performed by: NURSE PRACTITIONER

## 2024-03-18 PROCEDURE — 3074F SYST BP LT 130 MM HG: CPT | Mod: CPTII,,, | Performed by: NURSE PRACTITIONER

## 2024-03-18 PROCEDURE — 99213 OFFICE O/P EST LOW 20 MIN: CPT | Mod: ,,, | Performed by: NURSE PRACTITIONER

## 2024-03-18 PROCEDURE — 1160F RVW MEDS BY RX/DR IN RCRD: CPT | Mod: CPTII,,, | Performed by: NURSE PRACTITIONER

## 2024-03-18 PROCEDURE — 1159F MED LIST DOCD IN RCRD: CPT | Mod: CPTII,,, | Performed by: NURSE PRACTITIONER

## 2024-03-18 PROCEDURE — 3008F BODY MASS INDEX DOCD: CPT | Mod: CPTII,,, | Performed by: NURSE PRACTITIONER

## 2024-03-18 RX ORDER — VILAZODONE HYDROCHLORIDE 40 MG/1
40 TABLET ORAL DAILY
Qty: 30 TABLET | Refills: 5 | Status: SHIPPED | OUTPATIENT
Start: 2024-03-18

## 2024-03-18 RX ORDER — ALPRAZOLAM 1 MG/1
1 TABLET ORAL DAILY PRN
Qty: 20 TABLET | Refills: 3 | Status: SHIPPED | OUTPATIENT
Start: 2024-03-18

## 2024-03-18 RX ORDER — PANTOPRAZOLE SODIUM 40 MG/1
40 TABLET, DELAYED RELEASE ORAL DAILY
Qty: 30 TABLET | Refills: 3 | Status: SHIPPED | OUTPATIENT
Start: 2024-03-18

## 2024-03-18 NOTE — PROGRESS NOTES
Patient ID: Dawna Romero  : 1975     Chief Complaint: Depression    Allergies: Patient has No Known Allergies.     History of Present Illness:  The patient is a 48 y.o. White female who presents to clinic for evaluation and management with a chief complaint of Depression   Reports mood stable on current medications.     Depression  Visit Type: follow-up  Patient presents with the following symptoms: excessive worry, muscle tension, nervousness/anxiety, panic and restlessness.  Patient is not experiencing: compulsions, confusion, decreased concentration, depressed mood, dry mouth, hyperventilation, impotence, insomnia, irritability, malaise, obsessions, shortness of breath and suicidal ideas.      Anxiety  Presents for follow-up visit. Symptoms include excessive worry, muscle tension, nervous/anxious behavior, panic and restlessness. Patient reports no chest pain, compulsions, confusion, decreased concentration, depressed mood, dizziness, dry mouth, feeling of choking, hyperventilation, impotence, insomnia, irritability, malaise, obsessions, shortness of breath or suicidal ideas. The severity of symptoms is causing significant distress and interfering with daily activities. The quality of sleep is fair.            Past Medical History:  has a past medical history of Anxiety, Generalized anxiety disorder, Heartburn, Insomnia, Numbness and tingling in right hand, Recurrent major depressive disorder, and Tobacco user.    Social History:  reports that she has been smoking cigarettes. She has a 10.0 pack-year smoking history. She has been exposed to tobacco smoke. She has never used smokeless tobacco. She reports that she does not drink alcohol and does not use drugs.    Care Team: Patient Care Team:  Shaheen Wong APRN as PCP - General (Family Medicine)  Peterson Jones Jr., MD as Consulting Physician (Obstetrics and Gynecology)  Akira Chamberlain OD as Consulting Physician  "(Optometry)  CASSIA Castro MD as Consulting Physician (General Surgery)     Current Medications:  Current Outpatient Medications   Medication Instructions    ALPRAZolam (XANAX) 1 mg, Oral, Daily PRN    famotidine (PEPCID) 20 mg, Oral, 2 times daily    pantoprazole (PROTONIX) 40 mg, Oral, Daily    sucralfate (CARAFATE) 1 g, Oral, 4 times daily    vilazodone (VIIBRYD) 40 mg, Oral, Daily       Review of Systems   Constitutional:  Negative for irritability.   Respiratory:  Negative for shortness of breath.    Cardiovascular:  Negative for chest pain.   Genitourinary:  Negative for impotence.   Neurological:  Negative for dizziness.   Psychiatric/Behavioral:  Positive for depression. Negative for confusion, decreased concentration and suicidal ideas. The patient is nervous/anxious. The patient does not have insomnia.         Visit Vitals  /78 (BP Location: Right arm, Patient Position: Sitting, BP Method: Medium (Manual))   Pulse 98   Temp 97.5 °F (36.4 °C) (Temporal)   Ht 5' 2.01" (1.575 m)   Wt 61.6 kg (135 lb 12.8 oz)   LMP 02/25/2024 (Approximate)   SpO2 98%   BMI 24.83 kg/m²       Physical Exam  Vitals reviewed.   Constitutional:       General: She is not in acute distress.     Appearance: Normal appearance.   HENT:      Head: Normocephalic and atraumatic.      Nose: Nose normal. No congestion.      Mouth/Throat:      Mouth: Mucous membranes are moist.      Pharynx: Oropharynx is clear. No posterior oropharyngeal erythema.   Eyes:      Conjunctiva/sclera: Conjunctivae normal.   Cardiovascular:      Rate and Rhythm: Normal rate and regular rhythm.   Pulmonary:      Effort: Pulmonary effort is normal.      Breath sounds: Normal breath sounds.   Musculoskeletal:         General: No swelling.   Skin:     General: Skin is warm and dry.      Coloration: Skin is not jaundiced.      Findings: No rash.   Neurological:      Mental Status: She is alert and oriented to person, place, and time.      Cranial " Nerves: No cranial nerve deficit.   Psychiatric:         Mood and Affect: Mood normal.         Behavior: Behavior normal.          Labs Reviewed:  Chemistry:  Lab Results   Component Value Date     09/08/2023    K 4.1 09/08/2023    CHLORIDE 103 09/08/2023    BUN 11.0 09/08/2023    CREATININE 0.55 (L) 09/08/2023    EGFRNORACEVR >90 09/08/2023    GLUCOSE 113 09/08/2023    CALCIUM 9.4 09/08/2023    ALKPHOS 65 09/08/2023    LABPROT 7.0 09/08/2023    ALBUMIN 4.3 09/08/2023    AST 30 09/08/2023    ALT 27 09/08/2023    TSH 0.995 05/31/2023        Lab Results   Component Value Date    HGBA1C 4.9 05/31/2023        Hematology:  Lab Results   Component Value Date    WBC 7.12 09/08/2023    RBC 4.57 09/08/2023    HGB 14.2 09/08/2023    HCT 41.5 09/08/2023    MCV 90.8 09/08/2023    MCH 31.1 09/08/2023    MCHC 34.2 09/08/2023    RDW 11.6 09/08/2023     09/08/2023    MPV 10.4 09/08/2023       Lipid Panel:  Lab Results   Component Value Date    CHOL 181 05/31/2023    HDL 68 (H) 05/31/2023    DLDL 91.2 05/31/2023    TRIG 58 05/31/2023        Assessment & Plan:  1. Recurrent major depressive disorder, remission status unspecified  Overview:  Prozac and Effexor Prozac, Effexor, Vistaril ineffective.  Mood improved with Viibryd and p.r.n. Xanax    Orders:  -     vilazodone (VIIBRYD) 40 mg Tab tablet; Take 1 tablet (40 mg total) by mouth once daily.  Dispense: 30 tablet; Refill: 5    2. Generalized anxiety disorder  Overview:  patient saw counselor and psychiatrist in the past. Uses xanax prn (has weaned to no more than 20 tabs per month)    Orders:  -     ALPRAZolam (XANAX) 1 MG tablet; Take 1 tablet (1 mg total) by mouth daily as needed for Anxiety.  Dispense: 20 tablet; Refill: 3    3. Heartburn  Comments:  complete work up with GI, continue famotidine, protonix for now.  Orders:  -     pantoprazole (PROTONIX) 40 MG tablet; Take 1 tablet (40 mg total) by mouth once daily.  Dispense: 30 tablet; Refill: 3         Future  Appointments   Date Time Provider Department Center   6/11/2024  8:00 AM LAB, [facility]   6/18/2024 10:00 [facility]       Follow up for Keep Apt as Scheduled. Call sooner if needed.    JOSE BOLDEN    Lab Frequency Next Occurrence   Ambulatory referral/consult to General Surgery Once 09/26/2023   CBC Auto Differential Once 06/18/2024   Comprehensive Metabolic Panel Once 06/18/2024   Lipid Panel Once 06/18/2024   TSH Once 06/18/2024   Hemoglobin A1C Once 06/18/2024

## 2024-03-27 ENCOUNTER — HOSPITAL ENCOUNTER (EMERGENCY)
Facility: HOSPITAL | Age: 49
Discharge: HOME | End: 2024-03-27
Attending: INTERNAL MEDICINE
Payer: MEDICAID

## 2024-03-27 VITALS
HEIGHT: 62 IN | DIASTOLIC BLOOD PRESSURE: 87 MMHG | WEIGHT: 138 LBS | HEART RATE: 76 BPM | SYSTOLIC BLOOD PRESSURE: 103 MMHG | TEMPERATURE: 98 F | RESPIRATION RATE: 18 BRPM | BODY MASS INDEX: 25.4 KG/M2 | OXYGEN SATURATION: 97 %

## 2024-03-27 DIAGNOSIS — K52.9 GASTROENTERITIS: Primary | ICD-10-CM

## 2024-03-27 DIAGNOSIS — R11.2 NAUSEA AND VOMITING, UNSPECIFIED VOMITING TYPE: ICD-10-CM

## 2024-03-27 DIAGNOSIS — D72.829 LEUKOCYTOSIS, UNSPECIFIED TYPE: ICD-10-CM

## 2024-03-27 LAB
ALBUMIN SERPL-MCNC: 5.3 G/DL (ref 3.4–5)
ALBUMIN/GLOB SERPL: 1.5 RATIO
ALP SERPL-CCNC: 81 UNIT/L (ref 50–144)
ALT SERPL-CCNC: 21 UNIT/L (ref 1–45)
ANION GAP SERPL CALC-SCNC: 12 MEQ/L (ref 2–13)
APPEARANCE UR: CLEAR
AST SERPL-CCNC: 29 UNIT/L (ref 14–36)
B-HCG SERPL QL: NEGATIVE
BACTERIA #/AREA URNS AUTO: ABNORMAL /HPF
BASOPHILS # BLD AUTO: 0.02 X10(3)/MCL (ref 0.01–0.08)
BASOPHILS NFR BLD AUTO: 0.1 % (ref 0.1–1.2)
BILIRUB SERPL-MCNC: 1.1 MG/DL (ref 0–1)
BILIRUB UR QL STRIP.AUTO: NEGATIVE
BUN SERPL-MCNC: 17 MG/DL (ref 7–20)
CALCIUM SERPL-MCNC: 10.7 MG/DL (ref 8.4–10.2)
CHLORIDE SERPL-SCNC: 108 MMOL/L (ref 98–110)
CO2 SERPL-SCNC: 17 MMOL/L (ref 21–32)
COLOR UR AUTO: YELLOW
CREAT SERPL-MCNC: 0.52 MG/DL (ref 0.66–1.25)
CREAT/UREA NIT SERPL: 33 (ref 12–20)
EOSINOPHIL # BLD AUTO: 0.07 X10(3)/MCL (ref 0.04–0.36)
EOSINOPHIL NFR BLD AUTO: 0.4 % (ref 0.7–7)
ERYTHROCYTE [DISTWIDTH] IN BLOOD BY AUTOMATED COUNT: 11.5 % (ref 11–14.5)
GFR SERPLBLD CREATININE-BSD FMLA CKD-EPI: >90 MLS/MIN/1.73/M2
GLOBULIN SER-MCNC: 3.5 GM/DL (ref 2–3.9)
GLUCOSE SERPL-MCNC: 122 MG/DL (ref 70–115)
GLUCOSE UR QL STRIP.AUTO: NEGATIVE
HCT VFR BLD AUTO: 47.9 % (ref 36–48)
HGB BLD-MCNC: 16.6 G/DL (ref 11.8–16)
IMM GRANULOCYTES # BLD AUTO: 0.04 X10(3)/MCL (ref 0–0.03)
IMM GRANULOCYTES NFR BLD AUTO: 0.2 % (ref 0–0.5)
KETONES UR QL STRIP.AUTO: 40
LEUKOCYTE ESTERASE UR QL STRIP.AUTO: NEGATIVE
LIPASE SERPL-CCNC: 25 U/L (ref 23–300)
LYMPHOCYTES # BLD AUTO: 1.17 X10(3)/MCL (ref 1.16–3.74)
LYMPHOCYTES NFR BLD AUTO: 7 % (ref 20–55)
MCH RBC QN AUTO: 31 PG (ref 27–34)
MCHC RBC AUTO-ENTMCNC: 34.7 G/DL (ref 31–37)
MCV RBC AUTO: 89.4 FL (ref 79–99)
MONOCYTES # BLD AUTO: 0.59 X10(3)/MCL (ref 0.24–0.36)
MONOCYTES NFR BLD AUTO: 3.5 % (ref 4.7–12.5)
MUCOUS THREADS URNS QL MICRO: ABNORMAL /LPF
NEUTROPHILS # BLD AUTO: 14.79 X10(3)/MCL (ref 1.56–6.13)
NEUTROPHILS NFR BLD AUTO: 88.8 % (ref 37–73)
NITRITE UR QL STRIP.AUTO: NEGATIVE
PH UR STRIP.AUTO: 7 [PH]
PLATELET # BLD AUTO: 353 X10(3)/MCL (ref 140–371)
PMV BLD AUTO: 10.5 FL (ref 9.4–12.4)
POTASSIUM SERPL-SCNC: 4.3 MMOL/L (ref 3.5–5.1)
PROT SERPL-MCNC: 8.8 GM/DL (ref 6.3–8.2)
PROT UR QL STRIP.AUTO: ABNORMAL
RBC # BLD AUTO: 5.36 X10(6)/MCL (ref 4–5.1)
RBC #/AREA URNS AUTO: ABNORMAL /HPF
RBC UR QL AUTO: ABNORMAL
SODIUM SERPL-SCNC: 137 MMOL/L (ref 135–145)
SP GR UR STRIP.AUTO: 1.02 (ref 1–1.03)
SQUAMOUS #/AREA URNS AUTO: ABNORMAL /HPF
UROBILINOGEN UR STRIP-ACNC: 0.2
WBC # SPEC AUTO: 16.68 X10(3)/MCL (ref 4–11.5)
WBC #/AREA URNS AUTO: ABNORMAL /HPF

## 2024-03-27 PROCEDURE — 81003 URINALYSIS AUTO W/O SCOPE: CPT | Performed by: INTERNAL MEDICINE

## 2024-03-27 PROCEDURE — 99285 EMERGENCY DEPT VISIT HI MDM: CPT | Mod: 25

## 2024-03-27 PROCEDURE — 96361 HYDRATE IV INFUSION ADD-ON: CPT

## 2024-03-27 PROCEDURE — 25000003 PHARM REV CODE 250: Performed by: INTERNAL MEDICINE

## 2024-03-27 PROCEDURE — 81025 URINE PREGNANCY TEST: CPT | Performed by: INTERNAL MEDICINE

## 2024-03-27 PROCEDURE — 80053 COMPREHEN METABOLIC PANEL: CPT | Performed by: INTERNAL MEDICINE

## 2024-03-27 PROCEDURE — 85025 COMPLETE CBC W/AUTO DIFF WBC: CPT | Performed by: INTERNAL MEDICINE

## 2024-03-27 PROCEDURE — 63600175 PHARM REV CODE 636 W HCPCS: Performed by: INTERNAL MEDICINE

## 2024-03-27 PROCEDURE — 96375 TX/PRO/DX INJ NEW DRUG ADDON: CPT

## 2024-03-27 PROCEDURE — 83690 ASSAY OF LIPASE: CPT | Performed by: INTERNAL MEDICINE

## 2024-03-27 PROCEDURE — 25500020 PHARM REV CODE 255: Performed by: INTERNAL MEDICINE

## 2024-03-27 PROCEDURE — 96365 THER/PROPH/DIAG IV INF INIT: CPT

## 2024-03-27 RX ORDER — ONDANSETRON 4 MG/1
4 TABLET, ORALLY DISINTEGRATING ORAL EVERY 6 HOURS PRN
Qty: 20 TABLET | Refills: 0 | Status: SHIPPED | OUTPATIENT
Start: 2024-03-27 | End: 2024-04-26

## 2024-03-27 RX ORDER — LEVOFLOXACIN 750 MG/1
750 TABLET ORAL DAILY
Qty: 7 TABLET | Refills: 0 | Status: SHIPPED | OUTPATIENT
Start: 2024-03-27

## 2024-03-27 RX ORDER — DICYCLOMINE HYDROCHLORIDE 10 MG/1
10 CAPSULE ORAL
Qty: 120 CAPSULE | Refills: 0 | Status: SHIPPED | OUTPATIENT
Start: 2024-03-27 | End: 2024-04-26

## 2024-03-27 RX ORDER — ONDANSETRON HYDROCHLORIDE 2 MG/ML
4 INJECTION, SOLUTION INTRAVENOUS
Status: COMPLETED | OUTPATIENT
Start: 2024-03-27 | End: 2024-03-27

## 2024-03-27 RX ORDER — FAMOTIDINE 10 MG/ML
20 INJECTION INTRAVENOUS
Status: COMPLETED | OUTPATIENT
Start: 2024-03-27 | End: 2024-03-27

## 2024-03-27 RX ADMIN — ONDANSETRON 4 MG: 2 INJECTION INTRAMUSCULAR; INTRAVENOUS at 10:03

## 2024-03-27 RX ADMIN — FAMOTIDINE 20 MG: 10 INJECTION, SOLUTION INTRAVENOUS at 10:03

## 2024-03-27 RX ADMIN — SODIUM CHLORIDE 1000 ML: 9 INJECTION, SOLUTION INTRAVENOUS at 10:03

## 2024-03-27 RX ADMIN — IOHEXOL 95 ML: 350 INJECTION, SOLUTION INTRAVENOUS at 10:03

## 2024-03-27 RX ADMIN — PIPERACILLIN AND TAZOBACTAM 4.5 G: 4; .5 INJECTION, POWDER, LYOPHILIZED, FOR SOLUTION INTRAVENOUS; PARENTERAL at 12:03

## 2024-03-27 NOTE — ED NOTES
Pt seen and evaluated by Dr. Randolph. Pt has no complaints at this time and verbalized understanding of education. Pt ambulated out of ER with steady gait.  No acute distress noted. See provider notes.

## 2024-03-27 NOTE — DISCHARGE INSTRUCTIONS
TAKE MEDICATIONS AS PRESCRIBED AND FOLLOW UP WITH GASTROENTEROLOGIST IF NEEDED.  RETURN TO EMERGENCY ROOM IF SYMPTOMS WORSEN.

## 2024-03-27 NOTE — ED PROVIDER NOTES
Encounter Date: 3/27/2024       History     Chief Complaint   Patient presents with    Abdominal Pain    Vomiting    Nausea    Diarrhea     Pt c/o upper middle abdominal pain, N/V/D starting last night. Pt actively vomiting. Pt became diaphoretic upon vomiting and complained of dizziness. Pt capillary refill <3. Pt acyanotic. No acute distress noted.       THIS IS A 48 YEARS OLD FEMALE PATIENT CAME INTO THE EMERGENCY ROOM WITH HISTORY OF HAVING NAUSEA, VOMITING, DIARRHEA SINCE YESTERDAY.  PATIENT HAD MULTIPLE EPISODES OF LOOSE WATERY STOOLS.  REPORTS HAVING EPIGASTRIC ABDOMINAL PAIN OF MODERATE INTENSITY ACHY TYPE WITH NO SPECIFIC AGGRAVATING OR RELIEVING FACTORS.  DENIES ANY BLOOD IN THE STOOL OR VOMITUS.  NO FEVER OR CHILLS.  NO DYSURIA OR FLANK PAIN.  HAD CHOLECYSTECTOMY AND TUBAL LIGATION IN THE PAST.  DENIES CHEST PAIN SHORTNESS OF BREATH OR PALPITATIONS.        Review of patient's allergies indicates:  No Known Allergies  Past Medical History:   Diagnosis Date    Anxiety     Generalized anxiety disorder     Heartburn     Insomnia     Numbness and tingling in right hand     Recurrent major depressive disorder     Tobacco user      Past Surgical History:   Procedure Laterality Date    CHOLECYSTECTOMY      EGD, WITH CLOSED BIOPSY N/A 11/8/2023    Procedure: EGD, WITH CLOSED BIOPSY;  Surgeon: CASSIA Castro MD;  Location: [facility];  Service: Endoscopy;  Laterality: N/A;  A) ANTRUM- BX H.PYLORI  B) STOMACH- LEISON OF PROXIMAL STOMACH BX    ESOPHAGOGASTRODUODENOSCOPY N/A 11/8/2023    Procedure: EGD (ESOPHAGOGASTRODUODENOSCOPY);  Surgeon: CASSIA Castro MD;  Location: [facility];  Service: Endoscopy;  Laterality: N/A;  POST OP: GASTRITIS     Z LINE AT 36     TUBAL LIGATION       Family History   Problem Relation Age of Onset    Diabetes type II Mother     No Known Problems Father      Social History     Tobacco Use    Smoking status: Every Day     Current packs/day: 1.00     Average packs/day: 1  pack/day for 10.0 years (10.0 ttl pk-yrs)     Types: Cigarettes     Passive exposure: Current    Smokeless tobacco: Never   Substance Use Topics    Alcohol use: Never    Drug use: Never     Review of Systems   Constitutional:  Negative for fatigue and fever.   HENT: Negative.     Eyes: Negative.    Respiratory:  Negative for cough, shortness of breath and wheezing.    Cardiovascular: Negative.  Negative for chest pain and palpitations.   Gastrointestinal:  Positive for abdominal pain, diarrhea, nausea and vomiting.   Endocrine: Negative.    Genitourinary:  Negative for dysuria, enuresis, flank pain and hematuria.   Musculoskeletal:  Positive for myalgias.   Skin: Negative.  Negative for rash.   Neurological: Negative.    Psychiatric/Behavioral: Negative.     All other systems reviewed and are negative.      Physical Exam     Initial Vitals [03/27/24 1001]   BP Pulse Resp Temp SpO2   125/73 88 18 98.3 °F (36.8 °C) 99 %      MAP       --         Physical Exam    Nursing note and vitals reviewed.  Constitutional: She appears well-developed and well-nourished.   HENT:   Head: Normocephalic and atraumatic.   Eyes: Conjunctivae and EOM are normal. Pupils are equal, round, and reactive to light.   Neck: Neck supple.   Normal range of motion.  Cardiovascular:  Normal rate, regular rhythm and normal heart sounds.           Pulmonary/Chest: Breath sounds normal.   Abdominal: Abdomen is soft. Bowel sounds are normal. She exhibits no mass. There is abdominal tenderness.   EPIGASTRIC TENDERNESS NOTED. There is no rebound and no guarding.   Musculoskeletal:         General: Normal range of motion.      Cervical back: Normal range of motion and neck supple.     Neurological: She is alert and oriented to person, place, and time. GCS score is 15. GCS eye subscore is 4. GCS verbal subscore is 5. GCS motor subscore is 6.   Skin: Skin is warm and dry. Capillary refill takes less than 2 seconds.         ED Course   Procedures  Labs  Reviewed   COMPREHENSIVE METABOLIC PANEL - Abnormal; Notable for the following components:       Result Value    Carbon Dioxide 17 (*)     Glucose Level 122 (*)     Creatinine 0.52 (*)     Calcium Level Total 10.7 (*)     Protein Total 8.8 (*)     Albumin Level 5.3 (*)     Bilirubin Total 1.1 (*)     BUN/Creatinine Ratio 33 (*)     All other components within normal limits   URINALYSIS, REFLEX TO URINE CULTURE - Abnormal; Notable for the following components:    Protein, UA Trace (*)     Ketones, UA 40 (*)     Blood, UA Moderate (*)     All other components within normal limits    Narrative:      URINE STABILITY IS 2 HOURS AT ROOM TEMP OR    SIX HOURS REFRIGERATED. PERFORMING TESTING ON    SPECIMENS GREATER THAN THIS AGE MAY AFFECT THE    FOLLOWING TESTS:    PH          SPECIFIC GRAVITY           BLOOD    CLARITY     BILIRUBIN               UROBILINOGEN   CBC WITH DIFFERENTIAL - Abnormal; Notable for the following components:    WBC 16.68 (*)     RBC 5.36 (*)     Hgb 16.6 (*)     Neut % 88.8 (*)     Lymph % 7.0 (*)     Mono % 3.5 (*)     Eos % 0.4 (*)     Neut # 14.79 (*)     Mono # 0.59 (*)     IG# 0.04 (*)     All other components within normal limits   URINALYSIS, MICROSCOPIC - Abnormal; Notable for the following components:    Bacteria, UA Few (*)     Mucous, UA Moderate (*)     RBC, UA 6-10 (*)     Squamous Epithelial Cells, UA Few (*)     All other components within normal limits   LIPASE - Normal   HCG QUALITATIVE URINE - Normal   CBC W/ AUTO DIFFERENTIAL    Narrative:     The following orders were created for panel order CBC W/ AUTO DIFFERENTIAL.  Procedure                               Abnormality         Status                     ---------                               -----------         ------                     CBC with Differential[[id number]]       Abnormal            Final result                 Please view results for these tests on the individual orders.          Imaging Results              CT  Abdomen Pelvis With IV Contrast NO Oral Contrast (Final result)  Result time 03/27/24 11:02:33      Final result by Juan Pimentel III, MD (03/27/24 11:02:33)                   Impression:      1. Chronic changes are present as described above.  See above comments.      Electronically signed by: Juan Pimentel  Date:    03/27/2024  Time:    11:02               Narrative:    EXAMINATION:  CT ABDOMEN PELVIS WITH IV CONTRAST    CLINICAL HISTORY AND TECHNIQUE:  Abdominal pain    This patient has had 0 CT and nuclear medicine scans performed within the last 12 months.    The following DOSE REDUCTION TECHNIQUES are used for all CT scans at [facility]:    1. Automated exposure control.  2. Adjustment of the mA and/or kv according to patient size.  3. Use of iterative reconstruction technique.    COMPARISON:  11/09/2022    FINDINGS:  Liver: No clinically significant abnormalities are noted.    Gallbladder/biliary system: Previous cholecystectomy.    Spleen: No clinically significant abnormalities are noted.    Adrenal glands: No clinically significant abnormalities are noted.    Pancreas: No clinically significant abnormalities are noted.    Kidneys/ureters: No clinically significant abnormalities are noted.    Urinary bladder: No clinically significant abnormalities are noted.    Uterus and ovaries: No clinically significant abnormalities are noted.    GI tract: Unopacified loops of large and small bowel as well as the gastric lumen and appendix are difficult to evaluate with no definite abnormalities appreciated.    Vascular structures: Mild atherosclerotic plaquing is noted within the abdominal aorta is primary branches.    Musculoskeletal structures: A moderate, levoconvex, scoliotic curvature of the lumbar spine is present with moderate degenerative changes noted involving the lumbar spine.    Miscellaneous: N/A                                    X-Rays:   Independently Interpreted Readings:   Other  Readings:  CT ABDOMEN SHOWS:   NO ACUTE INTRA-ABDOMINAL FINDINGS.    Medications   piperacillin-tazobactam (ZOSYN) 4.5 g in dextrose 5 % in water (D5W) 100 mL IVPB (MB+) (has no administration in time range)   sodium chloride 0.9% bolus 1,000 mL 1,000 mL (0 mLs Intravenous Stopped 3/27/24 1127)   famotidine (PF) injection 20 mg (20 mg Intravenous Given 3/27/24 1013)   ondansetron injection 4 mg (4 mg Intravenous Given 3/27/24 1013)   iohexoL (OMNIPAQUE 350) injection 95 mL (95 mLs Intravenous Given 3/27/24 1053)     Medical Decision Making  THIS IS A 48 YEARS OLD FEMALE PATIENT CAME INTO THE EMERGENCY ROOM WITH HISTORY OF HAVING NAUSEA, VOMITING, DIARRHEA SINCE YESTERDAY.  PATIENT HAD MULTIPLE EPISODES OF LOOSE WATERY STOOLS.  REPORTS HAVING EPIGASTRIC ABDOMINAL PAIN OF MODERATE INTENSITY ACHY TYPE WITH NO SPECIFIC AGGRAVATING OR RELIEVING FACTORS.  DENIES ANY BLOOD IN THE STOOL OR VOMITUS.  NO FEVER OR CHILLS.  NO DYSURIA OR FLANK PAIN.  HAD CHOLECYSTECTOMY AND TUBAL LIGATION IN THE PAST.  DENIES CHEST PAIN SHORTNESS OF BREATH OR PALPITATIONS.    CT ABDOMEN SHOWS NO ACUTE FINDINGS.    PATIENT HAS LEUKOCYTOSIS WITH ELEVATED NEUTROPHILS.  GIVEN ZOSYN.  ADVISED TO TAKE MEDICATIONS AS PRESCRIBED AND FOLLOW UP WITH GASTROENTEROLOGIST.      DIFFERENTIAL DIAGNOSIS: 1.  BACTERIAL GASTROENTERITIS 2. VIRAL GASTROENTERITIS 3.  APPENDICITIS    Amount and/or Complexity of Data Reviewed  Labs: ordered.  Radiology: ordered.    Risk  Prescription drug management.                                      Clinical Impression:  Final diagnoses:  [K52.9] Gastroenteritis (Primary)  [D72.829] Leukocytosis, unspecified type  [R11.2] Nausea and vomiting, unspecified vomiting type          ED Disposition Condition    Discharge Stable          ED Prescriptions       Medication Sig Dispense Start Date End Date Auth. Provider    levoFLOXacin (LEVAQUIN) 750 MG tablet Take 1 tablet (750 mg total) by mouth once daily. 7 tablet 3/27/2024 --  Jairo Randolph DO    ondansetron (ZOFRAN-ODT) 4 MG TbDL Take 1 tablet (4 mg total) by mouth every 6 (six) hours as needed (VOMITING). 20 tablet 3/27/2024 -- Jairo Randolph DO    dicyclomine (BENTYL) 10 MG capsule Take 1 capsule (10 mg total) by mouth 4 (four) times daily before meals and nightly. 120 capsule 3/27/2024 4/26/2024 Jairo Randolph DO          Follow-up Information       Follow up With Specialties Details Why Contact Info    GASTROENTEROLOGIST  In 1 day               Jairo Randolph DO  03/27/24 6135

## 2024-04-04 ENCOUNTER — LAB VISIT (OUTPATIENT)
Dept: LAB | Facility: HOSPITAL | Age: 49
End: 2024-04-04
Attending: SURGERY
Payer: MEDICAID

## 2024-04-04 DIAGNOSIS — R10.13 ABDOMINAL PAIN, EPIGASTRIC: Primary | ICD-10-CM

## 2024-04-04 LAB
C DIFF TOX A+B STL QL IA: NEGATIVE
CLOSTRIDIUM DIFFICILE GDH ANTIGEN (OHS): NEGATIVE
ROTAVIRUS ANTIGEN STOOL (OHS): NEGATIVE

## 2024-04-04 PROCEDURE — 87329 GIARDIA AG IA: CPT

## 2024-04-04 PROCEDURE — 87045 FECES CULTURE AEROBIC BACT: CPT

## 2024-04-04 PROCEDURE — 87209 SMEAR COMPLEX STAIN: CPT

## 2024-04-04 PROCEDURE — 86318 IA INFECTIOUS AGENT ANTIBODY: CPT

## 2024-04-04 PROCEDURE — 86759 ROTAVIRUS ANTIBODY: CPT

## 2024-04-05 LAB
CRYPTOSP AG SPEC QL: NEGATIVE
G LAMBLIA AG STL QL IA: NEGATIVE

## 2024-04-06 LAB — BACTERIA STL CULT: NORMAL

## 2024-04-09 LAB — O+P STL MICRO: NORMAL

## 2024-04-24 ENCOUNTER — TELEPHONE (OUTPATIENT)
Dept: FAMILY MEDICINE | Facility: CLINIC | Age: 49
End: 2024-04-24
Payer: MEDICAID

## 2024-04-26 ENCOUNTER — OFFICE VISIT (OUTPATIENT)
Dept: FAMILY MEDICINE | Facility: CLINIC | Age: 49
End: 2024-04-26
Payer: MEDICAID

## 2024-04-26 VITALS
BODY MASS INDEX: 25.21 KG/M2 | TEMPERATURE: 98 F | OXYGEN SATURATION: 98 % | DIASTOLIC BLOOD PRESSURE: 72 MMHG | HEIGHT: 62 IN | WEIGHT: 137 LBS | SYSTOLIC BLOOD PRESSURE: 122 MMHG | HEART RATE: 82 BPM

## 2024-04-26 DIAGNOSIS — L02.92 BOILS: Primary | ICD-10-CM

## 2024-04-26 DIAGNOSIS — Z87.440 HISTORY OF RECURRENT UTIS: ICD-10-CM

## 2024-04-26 LAB
BILIRUB SERPL-MCNC: NORMAL MG/DL
BLOOD URINE, POC: NORMAL
CLARITY, POC UA: CLEAR
COLOR, POC UA: YELLOW
GLUCOSE UR QL STRIP: NORMAL
KETONES UR QL STRIP: NORMAL
LEUKOCYTE ESTERASE URINE, POC: NORMAL
NITRITE, POC UA: NORMAL
PH, POC UA: 6
PROTEIN, POC: NORMAL
SPECIFIC GRAVITY, POC UA: 1.01
UROBILINOGEN, POC UA: 0.2

## 2024-04-26 PROCEDURE — 1160F RVW MEDS BY RX/DR IN RCRD: CPT | Mod: CPTII,,, | Performed by: NURSE PRACTITIONER

## 2024-04-26 PROCEDURE — 81002 URINALYSIS NONAUTO W/O SCOPE: CPT | Mod: ,,, | Performed by: NURSE PRACTITIONER

## 2024-04-26 PROCEDURE — 99214 OFFICE O/P EST MOD 30 MIN: CPT | Mod: ,,, | Performed by: NURSE PRACTITIONER

## 2024-04-26 PROCEDURE — 3074F SYST BP LT 130 MM HG: CPT | Mod: CPTII,,, | Performed by: NURSE PRACTITIONER

## 2024-04-26 PROCEDURE — 3078F DIAST BP <80 MM HG: CPT | Mod: CPTII,,, | Performed by: NURSE PRACTITIONER

## 2024-04-26 PROCEDURE — 1159F MED LIST DOCD IN RCRD: CPT | Mod: CPTII,,, | Performed by: NURSE PRACTITIONER

## 2024-04-26 PROCEDURE — 3008F BODY MASS INDEX DOCD: CPT | Mod: CPTII,,, | Performed by: NURSE PRACTITIONER

## 2024-04-26 RX ORDER — SULFAMETHOXAZOLE AND TRIMETHOPRIM 800; 160 MG/1; MG/1
1 TABLET ORAL 2 TIMES DAILY
COMMUNITY
Start: 2024-04-25 | End: 2024-04-26

## 2024-04-26 RX ORDER — CLINDAMYCIN HYDROCHLORIDE 300 MG/1
300 CAPSULE ORAL EVERY 8 HOURS
Qty: 30 CAPSULE | Refills: 0 | Status: SHIPPED | OUTPATIENT
Start: 2024-04-26

## 2024-04-26 NOTE — PROGRESS NOTES
Patient ID: Dawna Romero  : 1975     Chief Complaint: boils    Allergies: Patient has No Known Allergies.     History of Present Illness:  The patient is a 48 y.o. White female who presents to clinic for evaluation and management with a chief complaint of boils   Patient reports shaved bikini area last week. 2-3 days later developed tender red bumps. Began taking  clindamycin she had at home 2 days ago and using a drawing salve. Lesions have improved except the one on buttock.     Patient requests to recheck urine. Reports she went to ER last month and was placed on abx for UTI. Patient reports she did not have symptoms of uti then and denies symptoms of uti now.           Past Medical History:  has a past medical history of Anxiety, Generalized anxiety disorder, Heartburn, Insomnia, Numbness and tingling in right hand, Recurrent major depressive disorder, and Tobacco user.    Social History:  reports that she has been smoking cigarettes. She has a 10 pack-year smoking history. She has been exposed to tobacco smoke. She has never used smokeless tobacco. She reports that she does not drink alcohol and does not use drugs.    Care Team: Patient Care Team:  Shaheen Wong APRN as PCP - General (Family Medicine)  Peterson Jones Jr., MD as Consulting Physician (Obstetrics and Gynecology)  Akira Chamberlain OD as Consulting Physician (Optometry)  CASSIA Castro MD as Consulting Physician (General Surgery)     Current Medications:  Current Outpatient Medications   Medication Instructions    ALPRAZolam (XANAX) 1 mg, Oral, Daily PRN    clindamycin (CLEOCIN) 300 mg, Oral, Every 8 hours    famotidine (PEPCID) 20 mg, Oral, 2 times daily    levoFLOXacin (LEVAQUIN) 750 mg, Oral, Daily    pantoprazole (PROTONIX) 40 mg, Oral, Daily    sucralfate (CARAFATE) 1 g, Oral, 4 times daily    vilazodone (VIIBRYD) 40 mg, Oral, Daily       Review of Systems   Constitutional:  Negative for chills and fever.  "  Respiratory:  Negative for cough and shortness of breath.    Cardiovascular:  Negative for chest pain.   Gastrointestinal:  Negative for abdominal pain, constipation, diarrhea and nausea.   Genitourinary:  Negative for dysuria, frequency and hematuria.   Integumentary:  Positive for mole/lesion. Negative for pallor.   Neurological:  Negative for dizziness.   Psychiatric/Behavioral:  Negative for confusion, decreased concentration and suicidal ideas. The patient is nervous/anxious.         Visit Vitals  /72 (BP Location: Left arm)   Pulse 82   Temp 98.1 °F (36.7 °C) (Temporal)   Ht 5' 2" (1.575 m)   Wt 62.1 kg (137 lb)   LMP 04/21/2024   SpO2 98%   BMI 25.06 kg/m²       Physical Exam  Vitals reviewed. Exam conducted with a chaperone present (Vane).   Constitutional:       General: She is not in acute distress.     Appearance: Normal appearance.   HENT:      Head: Normocephalic and atraumatic.      Nose: Nose normal. No congestion.      Mouth/Throat:      Mouth: Mucous membranes are moist.      Pharynx: Oropharynx is clear. No posterior oropharyngeal erythema.   Eyes:      Conjunctiva/sclera: Conjunctivae normal.   Cardiovascular:      Rate and Rhythm: Normal rate and regular rhythm.   Pulmonary:      Effort: Pulmonary effort is normal.      Breath sounds: Normal breath sounds.   Abdominal:      General: There is no distension.      Tenderness: There is no abdominal tenderness.   Musculoskeletal:         General: No swelling.   Skin:     General: Skin is warm and dry.      Coloration: Skin is not jaundiced.      Findings: Lesion present. No rash.      Comments: 5 ruptured boils to bilateral labia majora, no drainage, mild erythema. 1 boil to left buttock near cleft, 1.5cm, indurated raised, erythematous. No drainage.    Neurological:      Mental Status: She is alert and oriented to person, place, and time.      Cranial Nerves: No cranial nerve deficit.   Psychiatric:         Mood and Affect: Mood normal.    "      Behavior: Behavior normal.          Labs Reviewed:  Chemistry:  Lab Results   Component Value Date     03/27/2024    K 4.3 03/27/2024    CHLORIDE 108 03/27/2024    BUN 17.0 03/27/2024    CREATININE 0.52 (L) 03/27/2024    EGFRNORACEVR >90 03/27/2024    GLUCOSE 122 (H) 03/27/2024    CALCIUM 10.7 (H) 03/27/2024    ALKPHOS 81 03/27/2024    LABPROT 8.8 (H) 03/27/2024    ALBUMIN 5.3 (H) 03/27/2024    AST 29 03/27/2024    ALT 21 03/27/2024    TSH 0.995 05/31/2023        Lab Results   Component Value Date    HGBA1C 4.9 05/31/2023        Hematology:  Lab Results   Component Value Date    WBC 16.68 (H) 03/27/2024    RBC 5.36 (H) 03/27/2024    HGB 16.6 (H) 03/27/2024    HCT 47.9 03/27/2024    MCV 89.4 03/27/2024    MCH 31.0 03/27/2024    MCHC 34.7 03/27/2024    RDW 11.5 03/27/2024     03/27/2024    MPV 10.5 03/27/2024       Lipid Panel:  Lab Results   Component Value Date    CHOL 181 05/31/2023    HDL 68 (H) 05/31/2023    DLDL 91.2 05/31/2023    TRIG 58 05/31/2023        Assessment & Plan:  1. Boils  Comments:  advised on 1/4cup bleach in bathwater once weekly. complete abx as prescribed.  Orders:  -     clindamycin (CLEOCIN) 300 MG capsule; Take 1 capsule (300 mg total) by mouth every 8 (eight) hours.  Dispense: 30 capsule; Refill: 0    2. History of recurrent UTIs  Comments:  repeat urine per patient request, UA clear  Orders:  -     POCT URINE DIPSTICK WITHOUT MICROSCOPE         Future Appointments   Date Time Provider Department Center   6/11/2024  8:00 AM LAB, [facility] LABORATORY [facility] [place]   6/18/2024 10:00 AM Shaheen Wong APRN [facility] [place]       Follow up if symptoms worsen or fail to improve, for Keep Apt as Scheduled. Call sooner if needed.    JOSE BOLDEN    Lab Frequency Next Occurrence   Ambulatory referral/consult to General Surgery Once 09/26/2023   CBC Auto Differential Once 06/18/2024   Comprehensive Metabolic Panel Once 06/18/2024   Lipid Panel Once  06/18/2024   TSH Once 06/18/2024   Hemoglobin A1C Once 06/18/2024

## 2024-06-04 DIAGNOSIS — R31.21 ASYMPTOMATIC MICROSCOPIC HEMATURIA: Primary | ICD-10-CM

## 2024-07-01 ENCOUNTER — HOSPITAL ENCOUNTER (OUTPATIENT)
Dept: RADIOLOGY | Facility: HOSPITAL | Age: 49
Discharge: HOME | End: 2024-07-01
Payer: MEDICAID

## 2024-07-01 DIAGNOSIS — R31.21 ASYMPTOMATIC MICROSCOPIC HEMATURIA: ICD-10-CM

## 2024-07-01 PROCEDURE — 76770 US EXAM ABDO BACK WALL COMP: CPT | Mod: TC

## 2024-07-24 PROCEDURE — 80061 LIPID PANEL: CPT | Performed by: NURSE PRACTITIONER

## 2024-07-24 PROCEDURE — 84443 ASSAY THYROID STIM HORMONE: CPT | Performed by: NURSE PRACTITIONER

## 2024-07-24 PROCEDURE — 83036 HEMOGLOBIN GLYCOSYLATED A1C: CPT | Performed by: NURSE PRACTITIONER

## 2024-07-24 PROCEDURE — 80053 COMPREHEN METABOLIC PANEL: CPT | Performed by: NURSE PRACTITIONER

## 2024-07-24 PROCEDURE — 85025 COMPLETE CBC W/AUTO DIFF WBC: CPT | Performed by: NURSE PRACTITIONER

## 2024-07-29 ENCOUNTER — OFFICE VISIT (OUTPATIENT)
Dept: FAMILY MEDICINE | Facility: CLINIC | Age: 49
End: 2024-07-29
Payer: MEDICAID

## 2024-07-29 VITALS
DIASTOLIC BLOOD PRESSURE: 80 MMHG | WEIGHT: 137 LBS | HEIGHT: 62 IN | HEART RATE: 88 BPM | BODY MASS INDEX: 25.21 KG/M2 | OXYGEN SATURATION: 98 % | TEMPERATURE: 98 F | SYSTOLIC BLOOD PRESSURE: 110 MMHG

## 2024-07-29 DIAGNOSIS — R12 HEARTBURN: ICD-10-CM

## 2024-07-29 DIAGNOSIS — F41.1 GENERALIZED ANXIETY DISORDER: ICD-10-CM

## 2024-07-29 DIAGNOSIS — F33.9 RECURRENT MAJOR DEPRESSIVE DISORDER, REMISSION STATUS UNSPECIFIED: ICD-10-CM

## 2024-07-29 DIAGNOSIS — Z00.01 ABNORMAL WELLNESS EXAM: Primary | ICD-10-CM

## 2024-07-29 DIAGNOSIS — Z72.0 TOBACCO USER: ICD-10-CM

## 2024-07-29 PROBLEM — R10.13 EPIGASTRIC PAIN: Status: RESOLVED | Noted: 2023-06-13 | Resolved: 2024-07-29

## 2024-07-29 PROBLEM — K29.00 ACUTE SUPERFICIAL GASTRITIS WITHOUT HEMORRHAGE: Status: RESOLVED | Noted: 2023-08-17 | Resolved: 2024-07-29

## 2024-07-29 PROCEDURE — 3044F HG A1C LEVEL LT 7.0%: CPT | Mod: CPTII,,, | Performed by: NURSE PRACTITIONER

## 2024-07-29 PROCEDURE — 3079F DIAST BP 80-89 MM HG: CPT | Mod: CPTII,,, | Performed by: NURSE PRACTITIONER

## 2024-07-29 PROCEDURE — 3008F BODY MASS INDEX DOCD: CPT | Mod: CPTII,,, | Performed by: NURSE PRACTITIONER

## 2024-07-29 PROCEDURE — 1160F RVW MEDS BY RX/DR IN RCRD: CPT | Mod: CPTII,,, | Performed by: NURSE PRACTITIONER

## 2024-07-29 PROCEDURE — 99396 PREV VISIT EST AGE 40-64: CPT | Mod: ,,, | Performed by: NURSE PRACTITIONER

## 2024-07-29 PROCEDURE — 1159F MED LIST DOCD IN RCRD: CPT | Mod: CPTII,,, | Performed by: NURSE PRACTITIONER

## 2024-07-29 PROCEDURE — 3074F SYST BP LT 130 MM HG: CPT | Mod: CPTII,,, | Performed by: NURSE PRACTITIONER

## 2024-07-29 RX ORDER — FAMOTIDINE 20 MG/1
20 TABLET, FILM COATED ORAL 2 TIMES DAILY
Qty: 180 TABLET | Refills: 3 | Status: SHIPPED | OUTPATIENT
Start: 2024-07-29

## 2024-07-29 RX ORDER — PANTOPRAZOLE SODIUM 40 MG/1
40 TABLET, DELAYED RELEASE ORAL DAILY
Qty: 30 TABLET | Refills: 3 | Status: CANCELLED | OUTPATIENT
Start: 2024-07-29

## 2024-07-29 RX ORDER — ALPRAZOLAM 1 MG/1
1 TABLET ORAL DAILY PRN
Qty: 20 TABLET | Refills: 3 | Status: SHIPPED | OUTPATIENT
Start: 2024-07-29

## 2024-07-29 RX ORDER — VILAZODONE HYDROCHLORIDE 40 MG/1
40 TABLET ORAL DAILY
Qty: 90 TABLET | Refills: 3 | Status: SHIPPED | OUTPATIENT
Start: 2024-07-29

## 2024-07-29 NOTE — PROGRESS NOTES
Patient ID: Dawna Romero  : 1975    Chief Complaint: Annual Exam    Allergies: Patient has No Known Allergies.     History of Present Illness:  The patient is a 48 y.o. White female who presents to clinic for annual wellness visit.    Diet and nutrition:  Diet is high in salt, high in fat, low in fiber, high caloric intake, high carbohydrate meals, high calcium intake.    Fracture wrist: No history of fracture, no recent unexplained fracture.    Physical activity:  Does not exercise on a regular basis, good physical condition.    Depression risks:  + history of depression, never feel sad, empty, or tearful, no sleep disturbances, no agitation, no loss of energy, no feelings of worthlessness or guilt, no thoughts of suicide.    Orientation:  No disorientation to time, no disorientation to place.    Concentration and memory:  No decreased concentration ability, no memory lapses or loss, does not forget words.    Speech forward/motor difficulties: No speech difficulties, no difficulty expressing formulated concepts, no difficulty with fine manipulative tasks, no difficulty writing forward/copying, no slowed reaction time, does not knock things over when trying to pick them up.    Fall risk assessment:  No frequent falls while walking, no fall in the past year, no dizziness forward/vertigo, no fear of falling.  Hearing:  No loss of hearing, does not wear hearing aids.    Vision:  No vision problems, does wear glasses.    Activity of daily living: Able to bathe with limited or no assistance, able to control urination and bowels, able to dress with limited or no assistance, able to feed self with limited or no assistance, able to get out of chair or bed with limited or no assistance, able to [place] with limited or no assistance, able to toilet with limited are no assistance.    Activities of daily living:  Able to do housework with limited or no assistance, able to grocery shop with limited or no assistance,  able to manage medications with limited or no assistance, able to manage money with limited or no assistance, able to prepare meals with limited or no assistance, able to use the phone with limited or no assistance.    Screenings: not due for vaccinations, not due for breast cancer screening 1/5/24, not due for cervical cancer screening (Dr. Jones), not due for colorectal cancer screening (cologard dec 2022)             Past Medical History:  has a past medical history of Anxiety, Generalized anxiety disorder, Heartburn, Insomnia, Numbness and tingling in right hand, Recurrent major depressive disorder, and Tobacco user.    Surgical History:  has a past surgical history that includes Cholecystectomy; Tubal ligation; Esophagogastroduodenoscopy (N/A, 11/8/2023); and egd, with closed biopsy (N/A, 11/8/2023).    Family History: family history includes Breast cancer in her mother; Diabetes type II in her mother; No Known Problems in her father.    Social History:  reports that she has been smoking cigarettes. She has a 10 pack-year smoking history. She has been exposed to tobacco smoke. She has never used smokeless tobacco. She reports that she does not currently use drugs. She reports that she does not drink alcohol.    Care Team: Patient Care Team:  Shaheen Wong APRN as PCP - General (Family Medicine)  Peterson Jones Jr., MD as Consulting Physician (Obstetrics and Gynecology)  Akira Chamberlain OD as Consulting Physician (Optometry)  CASSIA Castro MD as Consulting Physician (General Surgery)     Current Medications:  Current Outpatient Medications   Medication Instructions    ALPRAZolam (XANAX) 1 mg, Oral, Daily PRN    famotidine (PEPCID) 20 mg, Oral, 2 times daily    pantoprazole (PROTONIX) 40 mg, Oral, Daily    sucralfate (CARAFATE) 1 g, Oral, 4 times daily    vilazodone (VIIBRYD) 40 mg, Oral, Daily       Review of Systems   Constitutional:  Negative for appetite change, fatigue, fever and  "unexpected weight change.   HENT:  Negative for nasal congestion, ear pain, facial swelling, hearing loss, mouth sores, nosebleeds, sore throat and trouble swallowing.    Eyes:  Negative for pain, discharge, redness and visual disturbance.   Respiratory:  Negative for cough, chest tightness and shortness of breath.    Cardiovascular:  Negative for chest pain, palpitations and leg swelling.   Gastrointestinal:  Negative for abdominal pain, blood in stool, constipation, diarrhea and nausea.   Endocrine: Negative for cold intolerance, heat intolerance, polydipsia, polyphagia and polyuria.   Genitourinary:  Negative for difficulty urinating, dysuria, frequency and hematuria.   Musculoskeletal:  Negative for arthralgias, joint swelling and joint deformity.   Integumentary:  Negative for color change, rash and mole/lesion.   Neurological:  Negative for dizziness, weakness, headaches and memory loss.   Hematological:  Negative for adenopathy. Does not bruise/bleed easily.   Psychiatric/Behavioral:  Negative for confusion, sleep disturbance and suicidal ideas. The patient is nervous/anxious.         Visit Vitals  /80 (BP Location: Right arm)   Pulse 88   Temp 98.1 °F (36.7 °C) (Temporal)   Ht 5' 2" (1.575 m)   Wt 62.1 kg (137 lb)   LMP 07/15/2024   SpO2 98%   BMI 25.06 kg/m²       Physical Exam  Vitals reviewed.   Constitutional:       General: She is not in acute distress.     Appearance: Normal appearance.   HENT:      Head: Normocephalic and atraumatic.      Right Ear: Tympanic membrane, ear canal and external ear normal.      Left Ear: Tympanic membrane, ear canal and external ear normal.      Nose: Nose normal. No congestion.      Mouth/Throat:      Mouth: Mucous membranes are moist.      Pharynx: Oropharynx is clear. No oropharyngeal exudate or posterior oropharyngeal erythema.   Eyes:      Conjunctiva/sclera: Conjunctivae normal.   Cardiovascular:      Rate and Rhythm: Normal rate and regular rhythm.      " Pulses: Normal pulses.      Heart sounds: No murmur heard.  Pulmonary:      Effort: Pulmonary effort is normal.      Breath sounds: Normal breath sounds.   Abdominal:      General: Bowel sounds are normal.      Palpations: Abdomen is soft.      Tenderness: There is no abdominal tenderness.   Musculoskeletal:         General: No swelling, tenderness or deformity. Normal range of motion.      Cervical back: Neck supple.   Lymphadenopathy:      Cervical: No cervical adenopathy.   Skin:     General: Skin is warm and dry.      Capillary Refill: Capillary refill takes less than 2 seconds.      Coloration: Skin is not jaundiced.      Findings: No rash.   Neurological:      Mental Status: She is alert and oriented to person, place, and time.      Cranial Nerves: No cranial nerve deficit.   Psychiatric:         Mood and Affect: Mood normal.         Behavior: Behavior normal.          Labs Reviewed:  Chemistry:  Lab Results   Component Value Date     07/24/2024    K 4.8 07/24/2024    BUN 11 07/24/2024    CREATININE 0.58 (L) 07/24/2024    EGFRNORACEVR >90 07/24/2024    GLUCOSE 87 07/24/2024    CALCIUM 9.9 07/24/2024    ALKPHOS 62 07/24/2024    LABPROT 7.1 07/24/2024    ALBUMIN 4.4 07/24/2024    AST 20 07/24/2024    ALT 12 07/24/2024    TSH 0.852 07/24/2024        Lab Results   Component Value Date    HGBA1C 4.7 07/24/2024        Hematology:  Lab Results   Component Value Date    WBC 9.30 07/24/2024    RBC 4.35 07/24/2024    HGB 13.9 07/24/2024    HCT 40.2 07/24/2024    MCV 92.4 07/24/2024    MCH 32.0 07/24/2024    MCHC 34.6 07/24/2024    RDW 12.0 07/24/2024     07/24/2024    MPV 11.0 07/24/2024       Lipid Panel:  Lab Results   Component Value Date    CHOL 195 07/24/2024    HDL 65 (H) 07/24/2024    LDLDIRECT 108.9 (H) 07/24/2024    TRIG 84 07/24/2024         Assessment & Plan:  1. Abnormal wellness exam  Overview:  Cervical Cancer Screening- established with Dr. Jones  Breast Cancer Screening- 1/5/24 mammogram,  continue annual  Osteoporosis Screening- not due  Colon Cancer Screening -  12/28/22 cologuard negative   Eye Exam- established with [facility]  Dental Exam- Re commend biannually.          2. Generalized anxiety disorder  Overview:  patient saw counselor and psychiatrist in the past. Uses xanax prn (has weaned to no more than 20 tabs per month)    Orders:  -     ALPRAZolam (XANAX) 1 MG tablet; Take 1 tablet (1 mg total) by mouth daily as needed for Anxiety.  Dispense: 20 tablet; Refill: 3    3. Recurrent major depressive disorder, remission status unspecified  Overview:  Prozac and Effexor Prozac, Effexor, Vistaril ineffective.  Mood improved with Viibryd and p.r.n. Xanax    Orders:  -     vilazodone (VIIBRYD) 40 mg Tab tablet; Take 1 tablet (40 mg total) by mouth once daily.  Dispense: 90 tablet; Refill: 3    4. Heartburn  Comments:  complete work up with GI, continue famotidine, protonix for now.  Overview:  complete work up with GI, continue famotidine BID    Orders:  -     famotidine (PEPCID) 20 MG tablet; Take 1 tablet (20 mg total) by mouth 2 (two) times daily.  Dispense: 180 tablet; Refill: 3    5. Heartburn  Overview:  complete work up with GI, continue famotidine BID    Orders:  -     famotidine (PEPCID) 20 MG tablet; Take 1 tablet (20 mg total) by mouth 2 (two) times daily.  Dispense: 180 tablet; Refill: 3    6. Tobacco user  Assessment & Plan:  Declines smoking cessation            Vaccinations:  Immunization History   Administered Date(s) Administered    DTaP 1975, 03/09/1978, 05/04/1978, 01/25/1979, 11/05/1981    IPV 1975, 03/09/1978, 05/04/1978, 01/25/1979, 11/05/1981    Influenza 02/10/2010    Influenza - Quadrivalent 10/30/2018, 10/07/2019, 03/16/2021    Influenza - Trivalent (ADULT) 02/10/2010    Influenza - Trivalent - PF (ADULT) 02/10/2010, 10/30/2018, 10/07/2019    MMR 01/25/1979    Td (ADULT) 06/15/1992, 06/05/2006    Tdap 11/08/2013, 05/12/2021       Future  Appointments   Date Time Provider Department Center   12/4/2024  8:30 AM Shaheen Wong APRN [facility]   7/28/2025  8:10 AM LAB, [facility] LABORATORY DRAW STATION [facility] [place] [facility]   8/4/2025  9:00 AM Shaheen Wong APRN [facility] [place] [facility]       Follow up for 1), 4 mo f/u, Depression/Anxiety, 15 min, 2), 1 year, 30 min, Wellness, Fasting labs prior. Call sooner if needed.    JOSE BOLDEN    Lab Frequency Next Occurrence   Ambulatory referral/consult to General Surgery Once 09/26/2023

## 2024-09-26 ENCOUNTER — TELEPHONE (OUTPATIENT)
Dept: FAMILY MEDICINE | Facility: CLINIC | Age: 49
End: 2024-09-26
Payer: MEDICAID

## (undated) DEVICE — BITE BLOCK ADULT LATEX FREE

## (undated) DEVICE — FORCEP ENDO BIOPSY CAPTURA

## (undated) DEVICE — GLOVE PROTEXIS HYDROGEL SZ6.5

## (undated) DEVICE — KIT SURGICAL COLON .25 1.1OZ